# Patient Record
Sex: FEMALE | Race: WHITE | Employment: FULL TIME | URBAN - NONMETROPOLITAN AREA
[De-identification: names, ages, dates, MRNs, and addresses within clinical notes are randomized per-mention and may not be internally consistent; named-entity substitution may affect disease eponyms.]

---

## 2017-01-27 ENCOUNTER — MEDICAL CORRESPONDENCE (OUTPATIENT)
Dept: HEALTH INFORMATION MANAGEMENT | Facility: HOSPITAL | Age: 42
End: 2017-01-27

## 2017-01-27 ENCOUNTER — TELEPHONE (OUTPATIENT)
Dept: FAMILY MEDICINE | Facility: OTHER | Age: 42
End: 2017-01-27

## 2017-01-27 NOTE — TELEPHONE ENCOUNTER
12:13 PM    Reason for Call: Phone Call    Description: Akiko (nurse) with Dr Evans's office at [facility] called/Dr Evans's ordered a depo injection 150 mg every 14 weeks for pt/wondering if pt can have this done at our clinic/please call them back at [phone]/if Akkio not available ok to leave message with another nurse    Was an appointment offered for this call? No    Preferred method for responding to this message: Telephone Call    If we cannot reach you directly, may we leave a detailed response at the number you provided? Yes    Can this message wait until your PCP/provider returns, if available today? Not applicable    Julia Stokes

## 2017-02-10 ENCOUNTER — APPOINTMENT (OUTPATIENT)
Dept: LAB | Facility: OTHER | Age: 42
End: 2017-02-10
Attending: FAMILY MEDICINE
Payer: COMMERCIAL

## 2017-02-10 ENCOUNTER — ALLIED HEALTH/NURSE VISIT (OUTPATIENT)
Dept: FAMILY MEDICINE | Facility: OTHER | Age: 42
End: 2017-02-10
Attending: FAMILY MEDICINE
Payer: COMMERCIAL

## 2017-02-10 DIAGNOSIS — Z30.42 ENCOUNTER FOR SURVEILLANCE OF INJECTABLE CONTRACEPTIVE: Primary | ICD-10-CM

## 2017-02-10 LAB — HCG UR QL: NEGATIVE

## 2017-02-10 PROCEDURE — 81025 URINE PREGNANCY TEST: CPT | Performed by: FAMILY MEDICINE

## 2017-02-10 PROCEDURE — 96372 THER/PROPH/DIAG INJ SC/IM: CPT

## 2017-02-10 NOTE — NURSING NOTE
Patient is here for a Depo provera that was ordered by Dr. Alexus Evans from [facility]. Written order that is scanned in.

## 2017-05-18 ENCOUNTER — ALLIED HEALTH/NURSE VISIT (OUTPATIENT)
Dept: FAMILY MEDICINE | Facility: OTHER | Age: 42
End: 2017-05-18
Attending: FAMILY MEDICINE
Payer: COMMERCIAL

## 2017-05-18 DIAGNOSIS — N83.209 OVARIAN CYST: Primary | ICD-10-CM

## 2017-05-18 PROCEDURE — 96372 THER/PROPH/DIAG INJ SC/IM: CPT

## 2017-05-18 RX ORDER — MEDROXYPROGESTERONE ACETATE 150 MG/ML
150 INJECTION, SUSPENSION INTRAMUSCULAR
COMMUNITY
End: 2018-01-25

## 2017-05-18 NOTE — NURSING NOTE
The following medication was given:     MEDICATION: Depo Provera 150mg  ROUTE: IM  SITE: Deltoid - Left  DOSE: 150mg  LOT #: [id number]  :  [employer]   EXPIRATION DATE:  11/2019  NDC#: 755301830-2  Next injection due Aug 24, 2017    Celeste Wray

## 2017-05-18 NOTE — PROGRESS NOTES
The following medication was given:     MEDICATION: Depo Provera 150mg  ROUTE: IM  SITE: Deltoid - Left  DOSE: 150mg  LOT #: [id number]  :  [employer]   EXPIRATION DATE:  11/2019  NDC#: 088232396-9  Next injection due Aug 24, 2017    Celeste Wray     Pt also reports 1st injection made her affect flat and no libido, did decide to go ahead with 2nd injection with the hopes this will lessen.  She will call Dr. Evans and discuss this with her.      Celeste Wray

## 2017-05-18 NOTE — MR AVS SNAPSHOT
"              After Visit Summary   2017    Dottie Rodriguez    MRN: [id number]           Patient Information     Date Of Birth          1975        Visit Information        Provider Department      2017 3:45 PM [facility] NURSE [facility]        Today's Diagnoses     Ovarian cyst    -  1       Follow-ups after your visit        Who to contact     If you have questions or need follow up information about today's clinic visit or your schedule please contact [facility] directly at [phone].  Normal or non-critical lab and imaging results will be communicated to you by Vertical Knowledgehart, letter or phone within 4 business days after the clinic has received the results. If you do not hear from us within 7 days, please contact the clinic through Vertical Knowledgehart or phone. If you have a critical or abnormal lab result, we will notify you by phone as soon as possible.  Submit refill requests through Admittedly or call your pharmacy and they will forward the refill request to us. Please allow 3 business days for your refill to be completed.          Additional Information About Your Visit        MyChart Information     Admittedly lets you send messages to your doctor, view your test results, renew your prescriptions, schedule appointments and more. To sign up, go to [email or web address] . Click on \"Log in\" on the left side of the screen, which will take you to the Welcome page. Then click on \"Sign up Now\" on the right side of the page.     You will be asked to enter the access code listed below, as well as some personal information. Please follow the directions to create your username and password.     Your access code is: [email or web address]  Expires: 2017  4:16 PM     Your access code will  in 90 days. If you need help or a new code, please call your [facility] or [phone].        Care EveryWhere ID     This is your Care EveryWhere ID. This could be used by other organizations to access your " [place] medical records  [id number]         Blood Pressure from Last 3 Encounters:   06/13/16 118/68   03/23/16 106/64    Weight from Last 3 Encounters:   06/13/16 179 lb 3.2 oz (81.3 kg)   03/23/16 183 lb (83 kg)              We Performed the Following     INJECTION INTRAMUSCULAR OR SUB-Q     Medroxyprogesterone inj  1mg   (Depo Provera J-Code)        Primary Care Provider    None Specified       No primary provider on file.        Thank you!     Thank you for choosing [facility]  for your care. Our goal is always to provide you with excellent care. Hearing back from our patients is one way we can continue to improve our services. Please take a few minutes to complete the written survey that you may receive in the mail after your visit with us. Thank you!             Your Updated Medication List - Protect others around you: Learn how to safely use, store and throw away your medicines at www.disposemymeds.org.          This list is accurate as of: 5/18/17  4:16 PM.  Always use your most recent med list.                   Brand Name Dispense Instructions for use    DEPO-PROVERA 150 MG/ML injection   Generic drug:  medroxyPROGESTERone      Inject 150 mg into the muscle Once every 14 weeks continue until January 2018       UNABLE TO FIND      MEDICATION NAME: fiber gummie 2-3 a day

## 2018-01-25 ENCOUNTER — OFFICE VISIT (OUTPATIENT)
Dept: FAMILY MEDICINE | Facility: OTHER | Age: 43
End: 2018-01-25
Attending: NURSE PRACTITIONER
Payer: COMMERCIAL

## 2018-01-25 VITALS
SYSTOLIC BLOOD PRESSURE: 106 MMHG | HEART RATE: 76 BPM | TEMPERATURE: 98.9 F | HEIGHT: 67 IN | WEIGHT: 195 LBS | DIASTOLIC BLOOD PRESSURE: 72 MMHG | BODY MASS INDEX: 30.61 KG/M2 | RESPIRATION RATE: 14 BRPM | OXYGEN SATURATION: 96 %

## 2018-01-25 DIAGNOSIS — R05.9 COUGH: Primary | ICD-10-CM

## 2018-01-25 DIAGNOSIS — J10.1 INFLUENZA A: ICD-10-CM

## 2018-01-25 LAB
FLUAV+FLUBV AG SPEC QL: NEGATIVE
FLUAV+FLUBV AG SPEC QL: POSITIVE
SPECIMEN SOURCE: ABNORMAL

## 2018-01-25 PROCEDURE — 99213 OFFICE O/P EST LOW 20 MIN: CPT | Performed by: NURSE PRACTITIONER

## 2018-01-25 PROCEDURE — 87804 INFLUENZA ASSAY W/OPTIC: CPT | Performed by: NURSE PRACTITIONER

## 2018-01-25 ASSESSMENT — ANXIETY QUESTIONNAIRES
3. WORRYING TOO MUCH ABOUT DIFFERENT THINGS: NOT AT ALL
2. NOT BEING ABLE TO STOP OR CONTROL WORRYING: NOT AT ALL
1. FEELING NERVOUS, ANXIOUS, OR ON EDGE: NOT AT ALL
7. FEELING AFRAID AS IF SOMETHING AWFUL MIGHT HAPPEN: NOT AT ALL
6. BECOMING EASILY ANNOYED OR IRRITABLE: NOT AT ALL
5. BEING SO RESTLESS THAT IT IS HARD TO SIT STILL: NOT AT ALL
GAD7 TOTAL SCORE: 0

## 2018-01-25 ASSESSMENT — PATIENT HEALTH QUESTIONNAIRE - PHQ9: 5. POOR APPETITE OR OVEREATING: NOT AT ALL

## 2018-01-25 NOTE — PROGRESS NOTES
SUBJECTIVE:   Dottie Rodriguez is a 42 year old female who presents to clinic today for the following health issues:      Acute Illness   Acute illness concerns: fever and cough  Onset: Sunday    Fever: YES    Chills/Sweats: YES    Headache (location?): YES    Sinus Pressure:YES    Conjunctivitis:  YES- a little bit    Ear Pain: YES- a little    Rhinorrhea: YES    Congestion: YES    Sore Throat: YES- on Tuesday     Cough: YES    Wheeze: no     Decreased Appetite: YES    Nausea: YES    Vomiting: no     Diarrhea:  no     Dysuria/Freq.: no     Fatigue/Achiness: YES    Sick/Strep Exposure: YES-      Therapies Tried and outcome: Advil      Problem list and histories reviewed & adjusted, as indicated.  Additional history: as documented    Patient Active Problem List   Diagnosis     ACP (advance care planning)     Past Surgical History:   Procedure Laterality Date     COLONOSCOPY  8/18/16     ENT SURGERY      T & A     GYN SURGERY      c section x 2       Social History   Substance Use Topics     Smoking status: Never Smoker     Smokeless tobacco: Never Used     Alcohol use 0.0 oz/week     0 Standard drinks or equivalent per week      Comment: social     Family History   Problem Relation Age of Onset     Hypertension Mother      Rheumatoid Arthritis Mother      Hyperlipidemia Father          Current Outpatient Prescriptions   Medication Sig Dispense Refill     UNABLE TO FIND MEDICATION NAME: fiber gummie 2-3 a day       Allergies   Allergen Reactions     Ciprofloxacin Nausea and Vomiting     Recent Labs   Lab Test  06/13/16   1710   ALT  26   CR  0.82   GFRESTIMATED  76   GFRESTBLACK  >90   GFR Calc     POTASSIUM  3.7   TSH  1.28      BP Readings from Last 3 Encounters:   01/25/18 106/72   06/13/16 118/68   03/23/16 106/64    Wt Readings from Last 3 Encounters:   01/25/18 195 lb (88.5 kg)   06/13/16 179 lb 3.2 oz (81.3 kg)   03/23/16 183 lb (83 kg)               Reviewed and updated as needed  "this visit by clinical staff  Tobacco  Allergies  Meds       Reviewed and updated as needed this visit by Provider         ROS:  Constitutional, HEENT, cardiovascular, pulmonary, gi and gu systems are negative, except as otherwise noted.    OBJECTIVE:     /72 (BP Location: Left arm, Patient Position: Sitting, Cuff Size: Adult Large)  Pulse 76  Temp 98.9  F (37.2  C) (Tympanic)  Resp 14  Ht 5' 7\" (1.702 m)  Wt 195 lb (88.5 kg)  SpO2 96%  BMI 30.54 kg/m2  Body mass index is 30.54 kg/(m^2).  GENERAL: alert and no distress but ill appearing  HENT: normal cephalic/atraumatic, both ears: dull, nose and mouth without ulcers or lesions, nasal mucosa edematous , rhinorrhea clear and oral mucous membranes moist, throat mildly erythematous without exudate.   NECK: no adenopathy, no asymmetry, masses, or scars and thyroid normal to palpation  RESP: lungs clear to auscultation - no rales, rhonchi or wheezes but mild cough on inspiration.   CV: regular rate and rhythm, normal S1 S2, no S3 or S4, no murmur, click or rub, no peripheral edema and peripheral pulses strong  MS: no gross musculoskeletal defects noted, no edema  PSYCH: mentation appears normal, affect normal/bright    Diagnostic Test Results:  Results for orders placed or performed in visit on 01/25/18 (from the past 24 hour(s))   Influenza A/B antigen   Result Value Ref Range    Influenza A/B Agn Specimen Nares     Influenza A Positive (A) NEG^Negative    Influenza B Negative NEG^Negative       ASSESSMENT/PLAN:       1. Cough  - Influenza A/B antigen - positive    2. Influenza A  Symptomatic cares, rest, fluids, tylenol/ibupforen per package directions.   Declined work note - out until Monday    FUTURE APPOINTMENTS:       - Follow-up visit as needed or if no improvement     Kaycee Salgado NP  [facility]"

## 2018-01-25 NOTE — MR AVS SNAPSHOT
"              After Visit Summary   2018    Dottie Rodriguez    MRN: [id number]           Patient Information     Date Of Birth          1975        Visit Information        Provider Department      2018 3:30 PM Kaycee Salgado NP [facility]        Today's Diagnoses     Cough    -  1    Influenza A           Follow-ups after your visit        Follow-up notes from your care team     Return if symptoms worsen or fail to improve.      Who to contact     If you have questions or need follow up information about today's clinic visit or your schedule please contact [facility] directly at [phone].  Normal or non-critical lab and imaging results will be communicated to you by MicroJobhart, letter or phone within 4 business days after the clinic has received the results. If you do not hear from us within 7 days, please contact the clinic through MicroJobhart or phone. If you have a critical or abnormal lab result, we will notify you by phone as soon as possible.  Submit refill requests through Cloudbot or call your pharmacy and they will forward the refill request to us. Please allow 3 business days for your refill to be completed.          Additional Information About Your Visit        MyChart Information     Cloudbot lets you send messages to your doctor, view your test results, renew your prescriptions, schedule appointments and more. To sign up, go to [email or web address] . Click on \"Log in\" on the left side of the screen, which will take you to the Welcome page. Then click on \"Sign up Now\" on the right side of the page.     You will be asked to enter the access code listed below, as well as some personal information. Please follow the directions to create your username and password.     Your access code is: [email or web address]  Expires: 2018  4:07 PM     Your access code will  in 90 days. If you need help or a new code, please call your [facility] or [phone].   " "     Care EveryWhere ID     This is your Care EveryWhere ID. This could be used by other organizations to access your [place] medical records  [id number]        Your Vitals Were     Pulse Temperature Respirations Height Pulse Oximetry BMI (Body Mass Index)    76 98.9  F (37.2  C) (Tympanic) 14 5' 7\" (1.702 m) 96% 30.54 kg/m2       Blood Pressure from Last 3 Encounters:   01/25/18 106/72   06/13/16 118/68   03/23/16 106/64    Weight from Last 3 Encounters:   01/25/18 195 lb (88.5 kg)   06/13/16 179 lb 3.2 oz (81.3 kg)   03/23/16 183 lb (83 kg)              We Performed the Following     Influenza A/B antigen        Primary Care Provider    None Specified       No primary provider on file.        Equal Access to Services     [facility]: Ginny Reid, gala trejo, danielle sahni, luis enrique more . [facility] [phone].    ATENCIÓN: Si habla español, tiene a venegas disposición servicios gratuitos de asistencia lingüística. Giovanna al [phone].    We comply with applicable federal civil rights laws and [place] laws. We do not discriminate on the basis of race, color, national origin, age, disability, sex, sexual orientation, or gender identity.            Thank you!     Thank you for choosing [facility]  for your care. Our goal is always to provide you with excellent care. Hearing back from our patients is one way we can continue to improve our services. Please take a few minutes to complete the written survey that you may receive in the mail after your visit with us. Thank you!             Your Updated Medication List - Protect others around you: Learn how to safely use, store and throw away your medicines at www.disposemymeds.org.          This list is accurate as of 1/25/18  4:07 PM.  Always use your most recent med list.                   Brand Name Dispense Instructions for use Diagnosis    UNABLE TO FIND      MEDICATION NAME: fiber gummie " 2-3 a day

## 2018-01-25 NOTE — NURSING NOTE
"Chief Complaint   Patient presents with     Fever     Cough       Initial /72 (BP Location: Left arm, Patient Position: Sitting, Cuff Size: Adult Large)  Pulse 76  Temp 98.9  F (37.2  C) (Tympanic)  Resp 14  Ht 5' 7\" (1.702 m)  Wt 195 lb (88.5 kg)  SpO2 96%  BMI 30.54 kg/m2 Estimated body mass index is 30.54 kg/(m^2) as calculated from the following:    Height as of this encounter: 5' 7\" (1.702 m).    Weight as of this encounter: 195 lb (88.5 kg).  Medication Reconciliation: complete   Cheli Sanchez      "

## 2018-01-26 ASSESSMENT — PATIENT HEALTH QUESTIONNAIRE - PHQ9: SUM OF ALL RESPONSES TO PHQ QUESTIONS 1-9: 0

## 2018-01-26 ASSESSMENT — ANXIETY QUESTIONNAIRES: GAD7 TOTAL SCORE: 0

## 2018-07-09 ENCOUNTER — OFFICE VISIT (OUTPATIENT)
Dept: FAMILY MEDICINE | Facility: OTHER | Age: 43
End: 2018-07-09
Attending: FAMILY MEDICINE
Payer: COMMERCIAL

## 2018-07-09 VITALS
HEART RATE: 63 BPM | TEMPERATURE: 97.7 F | OXYGEN SATURATION: 96 % | SYSTOLIC BLOOD PRESSURE: 116 MMHG | DIASTOLIC BLOOD PRESSURE: 80 MMHG | WEIGHT: 204.4 LBS | HEIGHT: 67 IN | BODY MASS INDEX: 32.08 KG/M2

## 2018-07-09 DIAGNOSIS — R10.13 EPIGASTRIC PAIN: Primary | ICD-10-CM

## 2018-07-09 PROCEDURE — 99213 OFFICE O/P EST LOW 20 MIN: CPT | Performed by: FAMILY MEDICINE

## 2018-07-09 ASSESSMENT — ANXIETY QUESTIONNAIRES
GAD7 TOTAL SCORE: 0
1. FEELING NERVOUS, ANXIOUS, OR ON EDGE: NOT AT ALL
2. NOT BEING ABLE TO STOP OR CONTROL WORRYING: NOT AT ALL
6. BECOMING EASILY ANNOYED OR IRRITABLE: NOT AT ALL
7. FEELING AFRAID AS IF SOMETHING AWFUL MIGHT HAPPEN: NOT AT ALL
4. TROUBLE RELAXING: NOT AT ALL
3. WORRYING TOO MUCH ABOUT DIFFERENT THINGS: NOT AT ALL
5. BEING SO RESTLESS THAT IT IS HARD TO SIT STILL: NOT AT ALL

## 2018-07-09 ASSESSMENT — PAIN SCALES - GENERAL: PAINLEVEL: NO PAIN (0)

## 2018-07-09 NOTE — NURSING NOTE
"Chief Complaint   Patient presents with     GI Problem       Initial /80  Pulse 63  Temp 97.7  F (36.5  C)  Ht 5' 7\" (1.702 m)  Wt 204 lb 6.4 oz (92.7 kg)  SpO2 96%  Breastfeeding? No  BMI 32.01 kg/m2 Estimated body mass index is 32.01 kg/(m^2) as calculated from the following:    Height as of this encounter: 5' 7\" (1.702 m).    Weight as of this encounter: 204 lb 6.4 oz (92.7 kg).  Medication Reconciliation: complete    KRISTA Pacheco    "

## 2018-07-09 NOTE — MR AVS SNAPSHOT
After Visit Summary   7/9/2018    Dottie Rodriguez    MRN: [id number]           Patient Information     Date Of Birth          1975        Visit Information        Provider Department      7/9/2018 8:30 AM Savanna Cunningham MD [facility]        Today's Diagnoses     Epigastric pain    -  1       Follow-ups after your visit        Follow-up notes from your care team     Return if symptoms worsen or fail to improve.      Your next 10 appointments already scheduled     Jul 10, 2018  1:00 PM CDT   US ABDOMEN COMPLETE with HIUS2   HI ULTRASOUND ([facility] )    [address] [place] [id number]   [phone]           Please bring a list of your medicines (including vitamins, minerals and over-the-counter drugs). Also, tell your doctor about any allergies you may have. Wear comfortable clothes and leave your valuables at home.  Adults: No eating or drinking for 8 hours before the exam. You may take medicine with a small sip of water.  Children: - Children 6+ years: No food or drink for 6 hours before exam. - Children 1-5 years: No food or drink for 4 hours before exam. - Infants, breast-fed: may have breast milk up to 2 hours before exam. - Infants, formula: may have bottle until 4 hours before exam.  Please call the Imaging Department at your exam site with any questions.              Future tests that were ordered for you today     Open Future Orders        Priority Expected Expires Ordered    US Abdomen Complete Routine  7/9/2019 7/9/2018            Who to contact     If you have questions or need follow up information about today's clinic visit or your schedule please contact [facility] directly at [phone].  Normal or non-critical lab and imaging results will be communicated to you by MyChart, letter or phone within 4 business days after the clinic has received the results. If you do not hear from us within 7 days, please contact the clinic  "through Pied Piper or phone. If you have a critical or abnormal lab result, we will notify you by phone as soon as possible.  Submit refill requests through Pied Piper or call your pharmacy and they will forward the refill request to us. Please allow 3 business days for your refill to be completed.          Additional Information About Your Visit        Pied Piper Information     Pied Piper lets you send messages to your doctor, view your test results, renew your prescriptions, schedule appointments and more. To sign up, go to [email or web address] . Click on \"Log in\" on the left side of the screen, which will take you to the Welcome page. Then click on \"Sign up Now\" on the right side of the page.     You will be asked to enter the access code listed below, as well as some personal information. Please follow the directions to create your username and password.     Your access code is: [email or web address]  Expires: 10/7/2018  8:20 AM     Your access code will  in 90 days. If you need help or a new code, please call your [place] clinic or [phone].        Care EveryWhere ID     This is your Care EveryWhere ID. This could be used by other organizations to access your [place] medical records  [id number]        Your Vitals Were     Pulse Temperature Height Pulse Oximetry Breastfeeding? BMI (Body Mass Index)    63 97.7  F (36.5  C) 5' 7\" (1.702 m) 96% No 32.01 kg/m2       Blood Pressure from Last 3 Encounters:   18 116/80   18 106/72   16 118/68    Weight from Last 3 Encounters:   18 204 lb 6.4 oz (92.7 kg)   18 195 lb (88.5 kg)   16 179 lb 3.2 oz (81.3 kg)               Primary Care Provider Fax #    Physician No Ref-Primary [phone]       No address on file        Equal Access to Services     [facility] : Ginny Reid, gala trejo, luis enrique mckay . [facility] [phone].    ATENCIÓN: Si cirilo mcleod " venegas disposición servicios gratuitos de asistencia lingüística. Giovanna roa [phone].    We comply with applicable federal civil rights laws and [place] laws. We do not discriminate on the basis of race, color, national origin, age, disability, sex, sexual orientation, or gender identity.            Thank you!     Thank you for choosing [facility]  for your care. Our goal is always to provide you with excellent care. Hearing back from our patients is one way we can continue to improve our services. Please take a few minutes to complete the written survey that you may receive in the mail after your visit with us. Thank you!             Your Updated Medication List - Protect others around you: Learn how to safely use, store and throw away your medicines at www.disposemymeds.org.          This list is accurate as of 7/9/18 10:04 AM.  Always use your most recent med list.                   Brand Name Dispense Instructions for use Diagnosis    PROBIOTIC DAILY PO           UNABLE TO FIND      MEDICATION NAME: fiber gummie 2-3 a day

## 2018-07-09 NOTE — PROGRESS NOTES
"        SUBJECTIVE:   Dottie Rodriguez is a 42 year old female who presents to clinic today for the following health issues:      ER/UC Follow up    Facility:  [facility]  Date of visit: 7/1/2018  Reason for visit: Epigastric pain  Current Status: Feeling fine overall.  Notes reviewed.  Patient states she will have intermittent symptoms.  She states that the GI cocktail given in the ER did not resolve symptoms.  She would like to get to the root cause of her symptoms.  She states she does have a significant family history of gallbladder issues.  She has never had an EGD previously.         Problem list and histories reviewed & adjusted, as indicated.  Additional history: as documented    Patient Active Problem List   Diagnosis     ACP (advance care planning)     Past Surgical History:   Procedure Laterality Date     COLONOSCOPY  8/18/16     ENT SURGERY      T & A     GYN SURGERY      c section x 2       Social History   Substance Use Topics     Smoking status: Never Smoker     Smokeless tobacco: Never Used     Alcohol use 0.0 oz/week     0 Standard drinks or equivalent per week      Comment: social     Family History   Problem Relation Age of Onset     Hypertension Mother      Rheumatoid Arthritis Mother      Hyperlipidemia Father          Current Outpatient Prescriptions   Medication Sig Dispense Refill     Probiotic Product (PROBIOTIC DAILY PO)        UNABLE TO FIND MEDICATION NAME: fiber gummie 2-3 a day       Allergies   Allergen Reactions     Ciprofloxacin Nausea and Vomiting       Reviewed and updated as needed this visit by clinical staff       Reviewed and updated as needed this visit by Provider         ROS:  Constitutional, HEENT, cardiovascular, pulmonary, gi and gu systems are negative, except as otherwise noted.    OBJECTIVE:     /80  Pulse 63  Temp 97.7  F (36.5  C)  Ht 5' 7\" (1.702 m)  Wt 204 lb 6.4 oz (92.7 kg)  SpO2 96%  Breastfeeding? No  BMI 32.01 kg/m2  Body mass index is 32.01 " kg/(m^2).  GENERAL: healthy, alert and no distress  PSYCH: mentation appears normal, affect normal/bright    Diagnostic Test Results:  none     ASSESSMENT/PLAN:     1. Epigastric pain  We will start with ultrasound to rule out gallbladder disease (consider HIDA scan if symptoms become more persistent and ultrasound is not clear).  Otherwise, if ultrasound is negative, would consider referral to GI for upper endoscopy.  Patient is agreeable to this plan.  - US Abdomen Complete; Future        Savanna Cunningham MD  [facility]

## 2018-07-10 ASSESSMENT — PATIENT HEALTH QUESTIONNAIRE - PHQ9: SUM OF ALL RESPONSES TO PHQ QUESTIONS 1-9: 0

## 2018-07-10 ASSESSMENT — ANXIETY QUESTIONNAIRES: GAD7 TOTAL SCORE: 0

## 2018-07-12 ENCOUNTER — HOSPITAL ENCOUNTER (OUTPATIENT)
Dept: ULTRASOUND IMAGING | Facility: HOSPITAL | Age: 43
Discharge: HOME | End: 2018-07-12
Attending: FAMILY MEDICINE | Admitting: FAMILY MEDICINE
Payer: COMMERCIAL

## 2018-07-12 DIAGNOSIS — R10.13 EPIGASTRIC PAIN: ICD-10-CM

## 2018-07-12 DIAGNOSIS — K80.20 SYMPTOMATIC CHOLELITHIASIS: Primary | ICD-10-CM

## 2018-07-12 PROCEDURE — 76705 ECHO EXAM OF ABDOMEN: CPT | Mod: TC

## 2018-08-02 ENCOUNTER — OFFICE VISIT (OUTPATIENT)
Dept: SURGERY | Facility: OTHER | Age: 43
End: 2018-08-02
Attending: FAMILY MEDICINE
Payer: COMMERCIAL

## 2018-08-02 VITALS
SYSTOLIC BLOOD PRESSURE: 92 MMHG | TEMPERATURE: 98.3 F | HEART RATE: 70 BPM | OXYGEN SATURATION: 98 % | BODY MASS INDEX: 32.02 KG/M2 | WEIGHT: 204 LBS | DIASTOLIC BLOOD PRESSURE: 62 MMHG | HEIGHT: 67 IN

## 2018-08-02 DIAGNOSIS — K80.20 SYMPTOMATIC CHOLELITHIASIS: ICD-10-CM

## 2018-08-02 PROCEDURE — 99204 OFFICE O/P NEW MOD 45 MIN: CPT | Performed by: SURGERY

## 2018-08-02 ASSESSMENT — PAIN SCALES - GENERAL: PAINLEVEL: NO PAIN (0)

## 2018-08-02 NOTE — PROGRESS NOTES
[facility] Surgery Consultation    CC:  Symptomatic cholelithiasis    HPI:  This 43 year old year old female is seen at the request of Savanna Willis for evaluation of symptomatic cholelithiasis.  The history is obtained from the patient, and reviewing the medical record.  She is good medical historian. She states that over the past six months she has had episodes of epigastric pain that will wake her up at night. The pain comes on and will last a few hours and then subside. A few times this has occurred she had eaten late at night. She even went into the emergency room to be evaluated. At that time she had labs drawn and was given a GI cocktail. Unfortunately she says that the GI cocktail, antacids and PPIs have not helped with these symptoms. The labs that were drawn demonstrated normal liver enzymes, electrolytes, and a mild leukocytosis. She was then seen by her PCP who ordered an ultrasound. The US showed cholelithiasis with no no evidence of cholecystitis. She was then referred for further evaluation.    Past Medical History:   Diagnosis Date     Ovarian cyst 05/18/2017    rupturing ovarian cysts       Past Surgical History:   Procedure Laterality Date     COLONOSCOPY  8/18/16     ENT SURGERY      T & A     GYN SURGERY      c section x 2       Pt denied problems with bleeding or anesthesia    Prior to Admission medications    Medication Sig Start Date End Date Taking? Authorizing Provider   HERBALS Take 2 each by mouth daily Protandim NRF-1 2 capsules by mouth daily   Yes Reported, Patient   HERBALS Take 1 each by mouth Protandim NRF-2 1 tablet by mouth daily   Yes Reported, Patient   Probiotic Product (PROBIOTIC DAILY PO)     Reported, Patient          Allergies   Allergen Reactions     Ciprofloxacin Nausea and Vomiting         HABITS:    Social History   Substance Use Topics     Smoking status: Never Smoker     Smokeless tobacco: Never Used     Alcohol use 0.0 oz/week     0 Standard drinks or  "equivalent per week      Comment: social     No mood altering drug use.    Family History   Problem Relation Age of Onset     Hypertension Mother      Rheumatoid Arthritis Mother      Hyperlipidemia Father        REVIEW OF SYSTEMS:  Ten point review of systems negative except those mentioned in the HPI.     The patient denies sleep apnea, latex allergies or MRSA    OBJECTIVE:    BP 92/62  Pulse 70  Temp 98.3  F (36.8  C)  Ht 5' 7\" (1.702 m)  Wt 204 lb (92.5 kg)  SpO2 98%  BMI 31.95 kg/m2    GENERAL: Generally appears well, in no distress with appropriate affect.  HEENT:   Sclerae anicteric - No cervical, supra/infraclavicular lymphadenopathy, no thyroid masses  Respiratory:  Lungs clear to ausculation bilaterally with good air excursion  Cardiovascular:  Regular Rate and Rhythm with no murmurs gallops or rubs, normal   Abdomen: soft, NT/ND  :  deferred  Extremities:  Extremities normal. No deformities, edema, or skin discoloration.  Skin:  no suspicious lesions or rashes  Neurological: grossly intact    Psych:  Alert, oriented, affect appropriate with normal decision making ability.      IMPRESSION:  44 yo female with symptomatic cholelithiasis    PLAN:  The pathophysiology of biliary tract disease was outlined.  The natural course of untreated disease was reviewed as was the definitive surgical approach with laparoscopic cholecystectomy and cholangiography.  The indications, risk, benefits, technical aspects, cosmetic and recuperative expectations were outlined with risks including but not limited to operative death, infection, bleeding and ductal injury requiring further operative intervention.  The need to convert to an open procedure for difficulty in achieving the laparoscopic approach was reviewed and the patient's questions asked and answered.    As she has these epigastric symptoms that are unrelieved by typical acid suppression I suggest that we proceed with a laparoscopic cholecystectomy. I did " discuss that if she continues to have pain we may have to pursue other avenues as to the cause. All questions and concerns were addressed.        Thank you for allowing me to participate in the care of your patient.       Tyshawn Ford MD    8/2/2018  10:40 AM    cc:  Savanna Willis

## 2018-08-02 NOTE — MR AVS SNAPSHOT
After Visit Summary   8/2/2018    Dottie Rodriguez    MRN: [id number]           Patient Information     Date Of Birth          1975        Visit Information        Provider Department      8/2/2018 9:30 AM Tyshawn Ford MD [facility]        Today's Diagnoses     Symptomatic cholelithiasis          Care Instructions    Thank you for allowing Dr. Ford and our surgical team to participate in your care.  If you have a scheduling or an appointment question please contact our Health Unit Coordinator at her direct line [phone].   ALL nursing questions or concerns can be directed to your clinic surgical nurse at: [phone]   Call the surgery nurse or your primary care provider if you should become ill within 1 week of your procedure and we will reschedule it when you are healthy. This includes signs or symptoms of a cold or the flu. This can include fever, chills, sore throat, cough, chest congestions, productive cough, runny nose.    You are scheduled for : laparoscopic cholecystectomy  Your procedure date is: 8/6/18      HOW TO PREPARE-    A responsible adult friend or family member must be available to drive you home AND stay with you for 24 hours after you leave the hospital. You will not be allowed to drive yourself. If you need to take a taxi or the bus you MUST have a responsible adult to ride with you. YOUR PROCEDURE WILL BE CANCELLED IF YOU DO NOT HAVE A RESPONSIBLE ADULT TO DRIVE YOU HOME.     Unless otherwise instructed, DO NOT have anything to eat or drink after midnight the night before your surgery, including water and coffee. Your stomach needs to be completely empty. Do NOT chew gum, suck on hard candy, or smoke. You can brush your teeth the morning of surgery.     Please call our Surgery Education Nurses 1-2 weeks prior to your surgery date at  [phone] or toll free [phone]. Please have you medication and allergy lists ready.    Stop your aspirin or  other NSAIDs(Ibuprofen, Motrin, Aleve, Celebrex, Naproxen, etc...) 7 days before your surgery unless otherwise instructed.    Hospital admitting will call you the day before your surgery with your arrival time. If you are scheduled on a Monday, you will be contacted the Friday before surgery.     You will need to wash the night before AND the morning of you procedure with the supplied Hibiclens following the instructions below.    Take shower with Hibiclens soap the night before surgery and morning of prior to surgery using the following directions:    SHOWERING BEFORE YOUR SURGERY    On The Night Before Your Surgery:  1. Remove all your jewelry and body piercings. (Leave these off until surgery completed.)  2. Shower your body from your neck to your feet using Hibiclens with a freshly laundered washcloth.  3. Include cleaning inside your belly button with a cotton swab.  4. Be sure to rinse off all the soap.  5. Dry your body with a newly laundered towel.  6. Wear newly laundered nightclothes.  7. Sleep in newly laundered bedding.    On The Morning of Your Surgery:  1. Shampoo your hair with your usual shampoo.  2. Shower your body with the Hibiclens again, with a freshly laundered washcloth.  3. Be sure to rinse off all the soap.  4. Dry your body with another newly laundered towel.  5. Wear newly laundered clothing to the hospital.      Return to clinic for a postop appointment 2 week(s) from your surgery date.              Follow-ups after your visit        Your next 10 appointments already scheduled     Aug 21, 2018  9:00 AM CDT   (Arrive by 8:45 AM)   Post Op with Tyshawn Ford MD   [facility] [place] ([facility] [place] )    [address]  [place] [address]   [phone]              Future tests that were ordered for you today     Open Future Orders        Priority Expected Expires Ordered    HCG qualitative urine STAT 8/6/2018 8/2/2019 8/2/2018            Who to contact     If  "you have questions or need follow up information about today's clinic visit or your schedule please contact [facility] directly at [phone].  Normal or non-critical lab and imaging results will be communicated to you by MyChart, letter or phone within 4 business days after the clinic has received the results. If you do not hear from us within 7 days, please contact the clinic through [employer] or phone. If you have a critical or abnormal lab result, we will notify you by phone as soon as possible.  Submit refill requests through [employer] or call your pharmacy and they will forward the refill request to us. Please allow 3 business days for your refill to be completed.          Additional Information About Your Visit        [employer] Information     [employer] lets you send messages to your doctor, view your test results, renew your prescriptions, schedule appointments and more. To sign up, go to [email or web address] . Click on \"Log in\" on the left side of the screen, which will take you to the Welcome page. Then click on \"Sign up Now\" on the right side of the page.     You will be asked to enter the access code listed below, as well as some personal information. Please follow the directions to create your username and password.     Your access code is: [email or web address]  Expires: 10/7/2018  8:20 AM     Your access code will  in 90 days. If you need help or a new code, please call your [facility] clinic or [phone].        Care EveryWhere ID     This is your Care EveryWhere ID. This could be used by other organizations to access your [facility] medical records  [id number]        Your Vitals Were     Pulse Temperature Height Pulse Oximetry BMI (Body Mass Index)       70 98.3  F (36.8  C) 5' 7\" (1.702 m) 98% 31.95 kg/m2        Blood Pressure from Last 3 Encounters:   18 92/62   18 116/80   18 106/72    Weight from Last 3 Encounters:   18 204 lb (92.5 kg)   18 204 lb 6.4 oz " (92.7 kg)   01/25/18 195 lb (88.5 kg)               Primary Care Provider Office Phone # Fax #    Savanna Cunningham [phone] [address] [facility] [address]        Equal Access to Services     NANDO PURVI AH: Hadii crystal ku hadasho Soomaali, waaxda luqadaha, qaybta kaalmada adeegyada, waxmeena ponce hayjavidn bernard cruzashlyalexander [place]. So [facility] [phone].    ATENCIÓN: Si habla español, tiene a venegas disposición servicios gratuitos de asistencia lingüística. Llame al [phone].    We comply with applicable federal civil rights laws and [place] laws. We do not discriminate on the basis of race, color, national origin, age, disability, sex, sexual orientation, or gender identity.            Thank you!     Thank you for choosing [facility]  for your care. Our goal is always to provide you with excellent care. Hearing back from our patients is one way we can continue to improve our services. Please take a few minutes to complete the written survey that you may receive in the mail after your visit with us. Thank you!             Your Updated Medication List - Protect others around you: Learn how to safely use, store and throw away your medicines at www.disposemymeds.org.          This list is accurate as of 8/2/18 10:26 AM.  Always use your most recent med list.                   Brand Name Dispense Instructions for use Diagnosis    * HERBALS      Take 2 each by mouth daily Protandim NRF-1 2 capsules by mouth daily        * HERBALS      Take 1 each by mouth Protandim NRF-2 1 tablet by mouth daily        PROBIOTIC DAILY PO           * Notice:  This list has 2 medication(s) that are the same as other medications prescribed for you. Read the directions carefully, and ask your doctor or other care provider to review them with you.

## 2018-08-02 NOTE — PATIENT INSTRUCTIONS
Thank you for allowing Dr. Ford and our surgical team to participate in your care.  If you have a scheduling or an appointment question please contact our Health Unit Coordinator at her direct line [phone].   ALL nursing questions or concerns can be directed to your clinic surgical nurse at: [phone]   Call the surgery nurse or your primary care provider if you should become ill within 1 week of your procedure and we will reschedule it when you are healthy. This includes signs or symptoms of a cold or the flu. This can include fever, chills, sore throat, cough, chest congestions, productive cough, runny nose.    You are scheduled for : laparoscopic cholecystectomy  Your procedure date is: 8/6/18      HOW TO PREPARE-    A responsible adult friend or family member must be available to drive you home AND stay with you for 24 hours after you leave the hospital. You will not be allowed to drive yourself. If you need to take a taxi or the bus you MUST have a responsible adult to ride with you. YOUR PROCEDURE WILL BE CANCELLED IF YOU DO NOT HAVE A RESPONSIBLE ADULT TO DRIVE YOU HOME.     Unless otherwise instructed, DO NOT have anything to eat or drink after midnight the night before your surgery, including water and coffee. Your stomach needs to be completely empty. Do NOT chew gum, suck on hard candy, or smoke. You can brush your teeth the morning of surgery.     Please call our Surgery Education Nurses 1-2 weeks prior to your surgery date at  [phone] or toll free [phone]. Please have you medication and allergy lists ready.    Stop your aspirin or other NSAIDs(Ibuprofen, Motrin, Aleve, Celebrex, Naproxen, etc...) 7 days before your surgery unless otherwise instructed.    Hospital admitting will call you the day before your surgery with your arrival time. If you are scheduled on a Monday, you will be contacted the Friday before surgery.     You will need to wash the night before AND the morning of  you procedure with the supplied Hibiclens following the instructions below.    Take shower with Hibiclens soap the night before surgery and morning of prior to surgery using the following directions:    SHOWERING BEFORE YOUR SURGERY    On The Night Before Your Surgery:  1. Remove all your jewelry and body piercings. (Leave these off until surgery completed.)  2. Shower your body from your neck to your feet using Hibiclens with a freshly laundered washcloth.  3. Include cleaning inside your belly button with a cotton swab.  4. Be sure to rinse off all the soap.  5. Dry your body with a newly laundered towel.  6. Wear newly laundered nightclothes.  7. Sleep in newly laundered bedding.    On The Morning of Your Surgery:  1. Shampoo your hair with your usual shampoo.  2. Shower your body with the Hibiclens again, with a freshly laundered washcloth.  3. Be sure to rinse off all the soap.  4. Dry your body with another newly laundered towel.  5. Wear newly laundered clothing to the hospital.      Return to clinic for a postop appointment 2 week(s) from your surgery date.

## 2018-08-02 NOTE — NURSING NOTE
"Chief Complaint   Patient presents with     Consult For     cholelithiasis. Referred by Dr. Willis.       Initial BP 92/62  Pulse 70  Temp 98.3  F (36.8  C)  Ht 5' 7\" (1.702 m)  Wt 204 lb (92.5 kg)  SpO2 98%  BMI 31.95 kg/m2 Estimated body mass index is 31.95 kg/(m^2) as calculated from the following:    Height as of this encounter: 5' 7\" (1.702 m).    Weight as of this encounter: 204 lb (92.5 kg).  Medication Reconciliation: complete    KHALIDA MCFARLANE LPN    "

## 2018-08-03 ENCOUNTER — ANESTHESIA EVENT (OUTPATIENT)
Dept: SURGERY | Facility: HOSPITAL | Age: 43
End: 2018-08-03
Payer: COMMERCIAL

## 2018-08-03 NOTE — ANESTHESIA PREPROCEDURE EVALUATION
Anesthesia Evaluation     . Pt has had prior anesthetic. Type: General and MAC    No history of anesthetic complications          ROS/MED HX    ENT/Pulmonary:  - neg pulmonary ROS     Neurologic:  - neg neurologic ROS   (+)migraines,     Cardiovascular:  - neg cardiovascular ROS       METS/Exercise Tolerance:     Hematologic:  - neg hematologic  ROS       Musculoskeletal:  - neg musculoskeletal ROS       GI/Hepatic:     (+) cholecystitis/cholelithiasis,       Renal/Genitourinary:  - ROS Renal section negative   (+) Other Renal/ Genitourinary, h/o Ovarian Cyst Rupture 5/18/2017      Endo:     (+) Obesity, .      Psychiatric:  - neg psychiatric ROS       Infectious Disease:  - neg infectious disease ROS       Malignancy:      - no malignancy   Other:                     Physical Exam  Normal systems: cardiovascular and pulmonary    Airway   Mallampati: I  TM distance: >3 FB  Neck ROM: full    Dental     Cardiovascular   Rhythm and rate: regular and normal      Pulmonary    breath sounds clear to auscultation                    Anesthesia Plan      History & Physical Review      ASA Status:  2 .        Plan for General and ETT with Intravenous and Propofol induction. Maintenance will be Balanced.    PONV prophylaxis:  Ondansetron (or other 5HT-3), Scopolamine patch and Dexamethasone or Solumedrol  HCG Negative      Postoperative Care  Postoperative pain management:  IV analgesics and Oral pain medications.      Consents  Anesthetic plan, risks, benefits and alternatives discussed with:  Patient..                          .

## 2018-08-06 ENCOUNTER — APPOINTMENT (OUTPATIENT)
Dept: LAB | Facility: HOSPITAL | Age: 43
End: 2018-08-06
Attending: SURGERY
Payer: COMMERCIAL

## 2018-08-06 ENCOUNTER — HOSPITAL ENCOUNTER (OUTPATIENT)
Facility: HOSPITAL | Age: 43
Discharge: HOME | End: 2018-08-06
Attending: SURGERY | Admitting: SURGERY
Payer: COMMERCIAL

## 2018-08-06 ENCOUNTER — ANESTHESIA (OUTPATIENT)
Dept: SURGERY | Facility: HOSPITAL | Age: 43
End: 2018-08-06
Payer: COMMERCIAL

## 2018-08-06 VITALS
HEIGHT: 67 IN | SYSTOLIC BLOOD PRESSURE: 137 MMHG | OXYGEN SATURATION: 94 % | RESPIRATION RATE: 18 BRPM | DIASTOLIC BLOOD PRESSURE: 85 MMHG | WEIGHT: 206.6 LBS | BODY MASS INDEX: 32.43 KG/M2 | TEMPERATURE: 97.6 F

## 2018-08-06 DIAGNOSIS — K80.20 SYMPTOMATIC CHOLELITHIASIS: Primary | ICD-10-CM

## 2018-08-06 LAB — HCG UR QL: NEGATIVE

## 2018-08-06 PROCEDURE — 27210794 ZZH OR GENERAL SUPPLY STERILE: Performed by: SURGERY

## 2018-08-06 PROCEDURE — 40000065 ZZH STATISTIC EKG NON-CHARGEABLE

## 2018-08-06 PROCEDURE — 37000008 ZZH ANESTHESIA TECHNICAL FEE, 1ST 30 MIN: Performed by: SURGERY

## 2018-08-06 PROCEDURE — 27110028 ZZH OR GENERAL SUPPLY NON-STERILE: Performed by: SURGERY

## 2018-08-06 PROCEDURE — 36000058 ZZH SURGERY LEVEL 3 EA 15 ADDTL MIN: Performed by: SURGERY

## 2018-08-06 PROCEDURE — 81025 URINE PREGNANCY TEST: CPT | Performed by: ANESTHESIOLOGY

## 2018-08-06 PROCEDURE — 71000015 ZZH RECOVERY PHASE 1 LEVEL 2 EA ADDTL HR: Performed by: SURGERY

## 2018-08-06 PROCEDURE — 40000305 ZZH STATISTIC PRE PROC ASSESS I: Performed by: SURGERY

## 2018-08-06 PROCEDURE — 25000125 ZZHC RX 250: Performed by: NURSE ANESTHETIST, CERTIFIED REGISTERED

## 2018-08-06 PROCEDURE — 25000566 ZZH SEVOFLURANE, EA 15 MIN: Performed by: NURSE ANESTHETIST, CERTIFIED REGISTERED

## 2018-08-06 PROCEDURE — 25000128 H RX IP 250 OP 636: Performed by: ANESTHESIOLOGY

## 2018-08-06 PROCEDURE — 71000014 ZZH RECOVERY PHASE 1 LEVEL 2 FIRST HR: Performed by: SURGERY

## 2018-08-06 PROCEDURE — 71000027 ZZH RECOVERY PHASE 2 EACH 15 MINS: Performed by: SURGERY

## 2018-08-06 PROCEDURE — 47562 LAPAROSCOPIC CHOLECYSTECTOMY: CPT | Performed by: SURGERY

## 2018-08-06 PROCEDURE — 36000056 ZZH SURGERY LEVEL 3 1ST 30 MIN: Performed by: SURGERY

## 2018-08-06 PROCEDURE — 01999 UNLISTED ANES PROCEDURE: CPT | Performed by: NURSE ANESTHETIST, CERTIFIED REGISTERED

## 2018-08-06 PROCEDURE — 25000128 H RX IP 250 OP 636: Performed by: NURSE ANESTHETIST, CERTIFIED REGISTERED

## 2018-08-06 PROCEDURE — 88304 TISSUE EXAM BY PATHOLOGIST: CPT | Mod: TC | Performed by: SURGERY

## 2018-08-06 PROCEDURE — 25000125 ZZHC RX 250: Performed by: ANESTHESIOLOGY

## 2018-08-06 PROCEDURE — 93005 ELECTROCARDIOGRAM TRACING: CPT

## 2018-08-06 PROCEDURE — 25000128 H RX IP 250 OP 636: Performed by: SURGERY

## 2018-08-06 PROCEDURE — 37000009 ZZH ANESTHESIA TECHNICAL FEE, EACH ADDTL 15 MIN: Performed by: SURGERY

## 2018-08-06 PROCEDURE — 25000125 ZZHC RX 250: Performed by: SURGERY

## 2018-08-06 PROCEDURE — 25000132 ZZH RX MED GY IP 250 OP 250 PS 637: Performed by: ANESTHESIOLOGY

## 2018-08-06 RX ORDER — ONDANSETRON 4 MG/1
4 TABLET, ORALLY DISINTEGRATING ORAL EVERY 30 MIN PRN
Status: DISCONTINUED | OUTPATIENT
Start: 2018-08-06 | End: 2018-08-06 | Stop reason: HOSPADM

## 2018-08-06 RX ORDER — FENTANYL CITRATE 50 UG/ML
25-50 INJECTION, SOLUTION INTRAMUSCULAR; INTRAVENOUS
Status: DISCONTINUED | OUTPATIENT
Start: 2018-08-06 | End: 2018-08-06 | Stop reason: HOSPADM

## 2018-08-06 RX ORDER — DEXAMETHASONE SODIUM PHOSPHATE 4 MG/ML
4 INJECTION, SOLUTION INTRA-ARTICULAR; INTRALESIONAL; INTRAMUSCULAR; INTRAVENOUS; SOFT TISSUE EVERY 10 MIN PRN
Status: DISCONTINUED | OUTPATIENT
Start: 2018-08-06 | End: 2018-08-06 | Stop reason: HOSPADM

## 2018-08-06 RX ORDER — NEOSTIGMINE METHYLSULFATE 1 MG/ML
VIAL (ML) INJECTION PRN
Status: DISCONTINUED | OUTPATIENT
Start: 2018-08-06 | End: 2018-08-06

## 2018-08-06 RX ORDER — MEPERIDINE HYDROCHLORIDE 50 MG/ML
12.5 INJECTION INTRAMUSCULAR; INTRAVENOUS; SUBCUTANEOUS
Status: DISCONTINUED | OUTPATIENT
Start: 2018-08-06 | End: 2018-08-06 | Stop reason: HOSPADM

## 2018-08-06 RX ORDER — NALOXONE HYDROCHLORIDE 0.4 MG/ML
.1-.4 INJECTION, SOLUTION INTRAMUSCULAR; INTRAVENOUS; SUBCUTANEOUS
Status: DISCONTINUED | OUTPATIENT
Start: 2018-08-06 | End: 2018-08-06 | Stop reason: HOSPADM

## 2018-08-06 RX ORDER — BUPIVACAINE HYDROCHLORIDE AND EPINEPHRINE 2.5; 5 MG/ML; UG/ML
INJECTION, SOLUTION EPIDURAL; INFILTRATION; INTRACAUDAL; PERINEURAL PRN
Status: DISCONTINUED | OUTPATIENT
Start: 2018-08-06 | End: 2018-08-06 | Stop reason: HOSPADM

## 2018-08-06 RX ORDER — GLYCOPYRROLATE 0.2 MG/ML
INJECTION, SOLUTION INTRAMUSCULAR; INTRAVENOUS PRN
Status: DISCONTINUED | OUTPATIENT
Start: 2018-08-06 | End: 2018-08-06

## 2018-08-06 RX ORDER — LIDOCAINE HYDROCHLORIDE 20 MG/ML
INJECTION, SOLUTION INFILTRATION; PERINEURAL PRN
Status: DISCONTINUED | OUTPATIENT
Start: 2018-08-06 | End: 2018-08-06

## 2018-08-06 RX ORDER — HYDRALAZINE HYDROCHLORIDE 20 MG/ML
2.5-5 INJECTION INTRAMUSCULAR; INTRAVENOUS EVERY 10 MIN PRN
Status: DISCONTINUED | OUTPATIENT
Start: 2018-08-06 | End: 2018-08-06 | Stop reason: HOSPADM

## 2018-08-06 RX ORDER — KETOROLAC TROMETHAMINE 30 MG/ML
30 INJECTION, SOLUTION INTRAMUSCULAR; INTRAVENOUS EVERY 6 HOURS PRN
Status: DISCONTINUED | OUTPATIENT
Start: 2018-08-06 | End: 2018-08-06 | Stop reason: HOSPADM

## 2018-08-06 RX ORDER — SODIUM CHLORIDE, SODIUM LACTATE, POTASSIUM CHLORIDE, CALCIUM CHLORIDE 600; 310; 30; 20 MG/100ML; MG/100ML; MG/100ML; MG/100ML
INJECTION, SOLUTION INTRAVENOUS CONTINUOUS
Status: DISCONTINUED | OUTPATIENT
Start: 2018-08-06 | End: 2018-08-06 | Stop reason: HOSPADM

## 2018-08-06 RX ORDER — KETOROLAC TROMETHAMINE 30 MG/ML
INJECTION, SOLUTION INTRAMUSCULAR; INTRAVENOUS PRN
Status: DISCONTINUED | OUTPATIENT
Start: 2018-08-06 | End: 2018-08-06

## 2018-08-06 RX ORDER — DEXAMETHASONE SODIUM PHOSPHATE 10 MG/ML
INJECTION, SOLUTION INTRAMUSCULAR; INTRAVENOUS PRN
Status: DISCONTINUED | OUTPATIENT
Start: 2018-08-06 | End: 2018-08-06

## 2018-08-06 RX ORDER — AMOXICILLIN 250 MG
1-2 CAPSULE ORAL 2 TIMES DAILY
Qty: 30 TABLET | Refills: 0 | Status: SHIPPED | OUTPATIENT
Start: 2018-08-06 | End: 2018-08-21

## 2018-08-06 RX ORDER — ONDANSETRON 2 MG/ML
INJECTION INTRAMUSCULAR; INTRAVENOUS PRN
Status: DISCONTINUED | OUTPATIENT
Start: 2018-08-06 | End: 2018-08-06

## 2018-08-06 RX ORDER — PROPOFOL 10 MG/ML
INJECTION, EMULSION INTRAVENOUS PRN
Status: DISCONTINUED | OUTPATIENT
Start: 2018-08-06 | End: 2018-08-06

## 2018-08-06 RX ORDER — LABETALOL HYDROCHLORIDE 5 MG/ML
10 INJECTION, SOLUTION INTRAVENOUS
Status: DISCONTINUED | OUTPATIENT
Start: 2018-08-06 | End: 2018-08-06 | Stop reason: HOSPADM

## 2018-08-06 RX ORDER — SCOLOPAMINE TRANSDERMAL SYSTEM 1 MG/1
1 PATCH, EXTENDED RELEASE TRANSDERMAL ONCE
Status: COMPLETED | OUTPATIENT
Start: 2018-08-06 | End: 2018-08-06

## 2018-08-06 RX ORDER — OXYCODONE HYDROCHLORIDE 5 MG/1
5 TABLET ORAL EVERY 4 HOURS PRN
Status: DISCONTINUED | OUTPATIENT
Start: 2018-08-06 | End: 2018-08-06 | Stop reason: HOSPADM

## 2018-08-06 RX ORDER — HYDROMORPHONE HYDROCHLORIDE 1 MG/ML
.3-.5 INJECTION, SOLUTION INTRAMUSCULAR; INTRAVENOUS; SUBCUTANEOUS EVERY 10 MIN PRN
Status: DISCONTINUED | OUTPATIENT
Start: 2018-08-06 | End: 2018-08-06 | Stop reason: HOSPADM

## 2018-08-06 RX ORDER — FENTANYL CITRATE 50 UG/ML
INJECTION, SOLUTION INTRAMUSCULAR; INTRAVENOUS PRN
Status: DISCONTINUED | OUTPATIENT
Start: 2018-08-06 | End: 2018-08-06

## 2018-08-06 RX ORDER — OXYCODONE HYDROCHLORIDE 5 MG/1
5-10 TABLET ORAL EVERY 6 HOURS PRN
Qty: 16 TABLET | Refills: 0 | Status: SHIPPED | OUTPATIENT
Start: 2018-08-06 | End: 2018-08-21

## 2018-08-06 RX ORDER — ALBUTEROL SULFATE 0.83 MG/ML
2.5 SOLUTION RESPIRATORY (INHALATION) EVERY 4 HOURS PRN
Status: DISCONTINUED | OUTPATIENT
Start: 2018-08-06 | End: 2018-08-06 | Stop reason: HOSPADM

## 2018-08-06 RX ORDER — ONDANSETRON 2 MG/ML
4 INJECTION INTRAMUSCULAR; INTRAVENOUS EVERY 30 MIN PRN
Status: DISCONTINUED | OUTPATIENT
Start: 2018-08-06 | End: 2018-08-06 | Stop reason: HOSPADM

## 2018-08-06 RX ADMIN — FENTANYL CITRATE 50 MCG: 50 INJECTION, SOLUTION INTRAMUSCULAR; INTRAVENOUS at 10:24

## 2018-08-06 RX ADMIN — PROPOFOL 200 MG: 10 INJECTION, EMULSION INTRAVENOUS at 08:11

## 2018-08-06 RX ADMIN — GLYCOPYRROLATE 0.4 MG: 0.2 INJECTION, SOLUTION INTRAMUSCULAR; INTRAVENOUS at 09:10

## 2018-08-06 RX ADMIN — KETOROLAC TROMETHAMINE 30 MG: 30 INJECTION, SOLUTION INTRAMUSCULAR at 09:00

## 2018-08-06 RX ADMIN — ROCURONIUM BROMIDE 20 MG: 10 INJECTION INTRAVENOUS at 08:23

## 2018-08-06 RX ADMIN — ONDANSETRON 4 MG: 4 TABLET, ORALLY DISINTEGRATING ORAL at 12:22

## 2018-08-06 RX ADMIN — Medication 100 MG: at 08:12

## 2018-08-06 RX ADMIN — ONDANSETRON 4 MG: 2 INJECTION INTRAMUSCULAR; INTRAVENOUS at 09:00

## 2018-08-06 RX ADMIN — DEXAMETHASONE SODIUM PHOSPHATE 10 MG: 10 INJECTION, SOLUTION INTRAMUSCULAR; INTRAVENOUS at 08:18

## 2018-08-06 RX ADMIN — SODIUM CHLORIDE, POTASSIUM CHLORIDE, SODIUM LACTATE AND CALCIUM CHLORIDE: 600; 310; 30; 20 INJECTION, SOLUTION INTRAVENOUS at 07:29

## 2018-08-06 RX ADMIN — Medication 2 MG: at 09:10

## 2018-08-06 RX ADMIN — FENTANYL CITRATE 50 MCG: 50 INJECTION, SOLUTION INTRAMUSCULAR; INTRAVENOUS at 10:15

## 2018-08-06 RX ADMIN — FENTANYL CITRATE 25 MCG: 50 INJECTION, SOLUTION INTRAMUSCULAR; INTRAVENOUS at 08:49

## 2018-08-06 RX ADMIN — OXYCODONE HYDROCHLORIDE 5 MG: 5 TABLET ORAL at 11:13

## 2018-08-06 RX ADMIN — FENTANYL CITRATE 25 MCG: 50 INJECTION, SOLUTION INTRAMUSCULAR; INTRAVENOUS at 08:40

## 2018-08-06 RX ADMIN — SCOPALAMINE 1 PATCH: 1 PATCH, EXTENDED RELEASE TRANSDERMAL at 07:29

## 2018-08-06 RX ADMIN — MIDAZOLAM 2 MG: 1 INJECTION INTRAMUSCULAR; INTRAVENOUS at 08:02

## 2018-08-06 RX ADMIN — LIDOCAINE HYDROCHLORIDE 40 MG: 20 INJECTION, SOLUTION INFILTRATION; PERINEURAL at 08:10

## 2018-08-06 RX ADMIN — CEFOXITIN SODIUM 2 G: 2 INJECTION, SOLUTION INTRAVENOUS at 08:18

## 2018-08-06 RX ADMIN — FENTANYL CITRATE 100 MCG: 50 INJECTION, SOLUTION INTRAMUSCULAR; INTRAVENOUS at 08:09

## 2018-08-06 RX ADMIN — FENTANYL CITRATE 50 MCG: 50 INJECTION, SOLUTION INTRAMUSCULAR; INTRAVENOUS at 10:06

## 2018-08-06 NOTE — ANESTHESIA POSTPROCEDURE EVALUATION
Patient: Dottie Rodriguez    Procedure(s):  LAPAROSCOPIC CHOLECYSTECTOMY - Wound Class: II-Clean Contaminated    Diagnosis:SYMPTOMATIC CHOLELITHIASIS  Diagnosis Additional Information: No value filed.    Anesthesia Type:  General, ETT    Note:  Anesthesia Post Evaluation    Patient location during evaluation: Bedside  Patient participation: Able to fully participate in evaluation  Level of consciousness: awake and alert  Pain management: adequate  Airway patency: patent  Cardiovascular status: acceptable and stable  Respiratory status: acceptable and room air  Hydration status: acceptable  PONV: none     Anesthetic complications: None          Last vitals:  Vitals:    08/06/18 1130 08/06/18 1145 08/06/18 1200   BP: 120/68 119/64 120/70   Resp: 18 18 18   Temp:      SpO2: 95% 94% 95%         Electronically Signed By: BRAD Noble CRNA  August 6, 2018  12:11 PM

## 2018-08-06 NOTE — OP NOTE
[place] [facility]ict[facility]    PREOPERATIVE DIAGNOSIS: Symptomatic cholelithiasis.     POSTOPERATIVE DIAGNOSES:  Symptomatic cholelithiasis    PROCEDURE: Laparoscopic cholecystectomy.    HISTORY: This 43 year old female developed abdominal pain and was seen in the emergency room. She was then seen by her PCP with and ultrasound showing cholelithiasis. As her symptoms were consistent with symptomatic cholelithiasis she was recommended to undergo cholecystectomy.    OPERATIVE FINDINGS:  The gallbladder contained multiple choleliths and was inflammed and edematous. There was no gross purulence.      DESCRIPTION OF PROCEDURE: With the patient in the supine position on the operating room table, general anesthesia was induced. The abdomen was prepped and draped sterilely and the requisite timeout pause observed during which her correct identity and planned procedure were confirmed by the operating room personnel in attendance. An infraumbilical curvilinear incision was made and carried through full thickness skin and subcutaneous tissue.  The fascia was identified and was incised and the abdomen was entered. The Che cannula was placed and a pneumoperitoneum was achieved with insufflation of carbon dioxide to 15 mmHg pressure and a 5 mm port site was placed in the epigastrium and a two 5 mm ports were placed subcostally at the midclavicular line and the anterior axillary line under direct vision. Inspection showed a somewhat thickened gallbladder wall with a few adhesions from the omentum to its anterior surface.  The gallbladder was full of multiple choleliths.   The gallbladder was grasped with ratcheted clamp and elevated cephalad. Using blunt dissection, the origin of the cystic duct was identified and the gallbladder neck was released from its reflection on the liver.   The cystic duct was skeletonized from the surrounding structures. The cystic duct was then skeletonized. With two tubular structures  entering the gallbladder and a critical view of safety obtained I proceeded with clipping.  The cystic duct was clipped and transected. The cystic artery was controlled with Ligaclips and divided. The gallbladder was taken down from its bed on the liver in an antegrade fashion with electrocautery dissection and it was retrieved intact in an endocatch bag through the umbilical port site without spillage.     The abdomen was irrigated with saline solution. There was mild bleeding from the gallbladder fossa which was controlled with electrocautery. The area was irrigated again and all of the fluid was aspirated. There was no further evidence of bleeding. The 5 mm working trocars were removed under direct visualization and pneumoperitoneum was released. The umbilical fascia was then closed with figure of eight 0 Vicryl sutures. The wounds were irrigated with saline and then injected with local anesthetic. The skin was reapproximated with 4-0 Monocryl. The incisions were then reinforced with Benzoin and steri-strips. Sterile dressings were applied and the patient was transported to the recovery room in satisfactory condition.     The estimated blood loss was 5 cc and there were no apparent intraoperative complications. The procedure was well tolerated and the patient left the operating room in good condition upon confirmation that the final sponge, needle and instruments counts were correct.  The post surgical debriefing was held and acknowledged at completion.    Tyshawn Ford MD  8/6/2018

## 2018-08-06 NOTE — OR NURSING
States pain minimal.Nausea eased.Patient and responsible adult given discharge instructions with no questions regarding instructions. Wilfrido score 19. Pain level 0/10.  Discharged from unit via w/c. Patient discharged to home.

## 2018-08-06 NOTE — DISCHARGE INSTRUCTIONS
ELECTIVE LAPAROSCOPIC CHOLECYSTECTOMY     ACTIVITY:    You may climb stairs.    You may lift or exercise when comfortable.    You may drive without restrictions when comfortable and not using prescription pain medications.    BATHING:    You may take a shower the day following surgery.  You may have steri-strips (looks like tape) on your incision.  They will fall off by themselves; if not, remove after one week.    DIET:    Starting with liquids, gradually return to the diet you were on before surgery.    CALL YOUR PHYSICIAN IF YOU HAVE:    Chills or fever about 101 F    Pain not relieved by your pain medication or rest    HELPFUL HINTS:    It is not uncommon to experience some loose stools or diarrhea after surgery.  This is your body s way of adapting.    You may experience shoulder pain which is due to the air placed within your abdomen during the procedure.  This is temporary and usually passes within 2 days.  If you have difficulty after surgery and are unable to contact your physician at his/her clinic, call the [facility] Emergency Room for assistance at [phone].Remove the scopolamine patch behind your right ear after 24 hours after application.   After removing the patch, wash your hands and the area behind your ear thoroughly with soap and water.   The patch will still contain some medicine after use.   To avoid accidental contact or ingestion by children or pets, fold the used patch in half with the sticky side together and throw away in the trash out of the reach of children and pets.         Post-Anesthesia Patient Instructions    IMMEDIATELY FOLLOWING SURGERY:  Do not drive or operate machinery for the first twenty four hours after surgery.  Do not make any important decisions for twenty four hours after surgery or while taking narcotic pain medications or sedatives.  If you develop intractable nausea and vomiting or a severe headache please notify your doctor immediately.    FOLLOW-UP:   Please make an appointment with your surgeon as instructed. You do not need to follow up with anesthesia unless specifically instructed to do so.    WOUND CARE INSTRUCTIONS (if applicable):  Keep a dry clean dressing on the anesthesia/puncture wound site if there is drainage.  Once the wound has quit draining you may leave it open to air.  Generally you should leave the bandage intact for twenty four hours unless there is drainage.  If the epidural site drains for more than 36-48 hours please call the anesthesia department.    QUESTIONS?:  Please feel free to call your physician or the hospital  if you have any questions, and they will be happy to assist you.

## 2018-08-06 NOTE — ANESTHESIA CARE TRANSFER NOTE
Patient: Dottie Rodriguez    Procedure(s):  LAPAROSCOPIC CHOLECYSTECTOMY - Wound Class: II-Clean Contaminated    Diagnosis: SYMPTOMATIC CHOLELITHIASIS  Diagnosis Additional Information: No value filed.    Anesthesia Type:   General, ETT     Note:  Airway :Nasal Cannula  Patient transferred to:PACU  Handoff Report: Identifed the Patient, Identified the Reponsible Provider, Reviewed the pertinent medical history, Discussed the surgical course, Reviewed Intra-OP anesthesia mangement and issues during anesthesia, Set expectations for post-procedure period and Allowed opportunity for questions and acknowledgement of understanding      Vitals: (Last set prior to Anesthesia Care Transfer)    CRNA VITALS  8/6/2018 0902 - 8/6/2018 0936      8/6/2018             Resp Rate (set): 8                Electronically Signed By: BRAD Sher CRNA  August 6, 2018  9:36 AM

## 2018-08-06 NOTE — IP AVS SNAPSHOT
MRN:[id number]                      After Visit Summary   8/6/2018    Dottie Rodriguez    MRN: [id number]           Thank you!     Thank you for choosing [facility] for your care. Our goal is always to provide you with excellent care. Hearing back from our patients is one way we can continue to improve our services. Please take a few minutes to complete the written survey that you may receive in the mail after you visit with us. Thank you!        Patient Information     Date Of Birth          1975        About your hospital stay     You were admitted on:  August 6, 2018 You last received care in the:  [facility] Preop/Phase II    You were discharged on:  August 6, 2018       Who to Call     For medical emergencies, please call 911.  For non-urgent questions about your medical care, please call your primary care provider or clinic, [phone]  For questions related to your surgery, please call your surgery clinic        Attending Provider     Provider Specialty    Tyshawn Ford MD Surgery       Primary Care Provider Office Phone # Fax #    Savanna Cunningham [phone]      After Care Instructions     Diet Instructions       Resume pre-procedure diet            Discharge Instructions       Follow up appointment as instructed by Surgeon and or RN            Dressing       Keep dressing clean and dry.  Dressing / incisional care as instructed by RN and or Surgeon. You may remove the bandaids on Wednesday. Once the band-aids are removed you can shower and let soap and water run over the incisions. Pat dry and do not scrub. The steri-strips will fall off on their own in approximately 1 week.            Ice to affected area       Ice to operative site PRN            No driving or operating machinery        While on narcotics            No lifting        No lifting over 20 lbs and no strenuous physical activity for 4 weeks            Shower       No shower for 48 hours post procedure.  May shower Postoperative Day (POD)  #2                  Your next 10 appointments already scheduled     Aug 21, 2018  9:00 AM CDT   (Arrive by 8:45 AM)   Post Op with Tyshawn Ford MD   [facility] ([facility] )    [address] [address]   [phone]              Further instructions from your care team             ELECTIVE LAPAROSCOPIC CHOLECYSTECTOMY     ACTIVITY:    You may climb stairs.    You may lift or exercise when comfortable.    You may drive without restrictions when comfortable and not using prescription pain medications.    BATHING:    You may take a shower the day following surgery.  You may have steri-strips (looks like tape) on your incision.  They will fall off by themselves; if not, remove after one week.    DIET:    Starting with liquids, gradually return to the diet you were on before surgery.    CALL YOUR PHYSICIAN IF YOU HAVE:    Chills or fever about 101 F    Pain not relieved by your pain medication or rest    HELPFUL HINTS:    It is not uncommon to experience some loose stools or diarrhea after surgery.  This is your body s way of adapting.    You may experience shoulder pain which is due to the air placed within your abdomen during the procedure.  This is temporary and usually passes within 2 days.  If you have difficulty after surgery and are unable to contact your physician at his/her clinic, call the hospital Emergency Room for assistance at [phone].Remove the scopolamine patch behind your right ear after 24 hours after application.   After removing the patch, wash your hands and the area behind your ear thoroughly with soap and water.   The patch will still contain some medicine after use.   To avoid accidental contact or ingestion by children or pets, fold the used patch in half with the sticky side together and throw away in the trash out of the reach of children and pets.         Post-Anesthesia Patient  "Instructions    IMMEDIATELY FOLLOWING SURGERY:  Do not drive or operate machinery for the first twenty four hours after surgery.  Do not make any important decisions for twenty four hours after surgery or while taking narcotic pain medications or sedatives.  If you develop intractable nausea and vomiting or a severe headache please notify your doctor immediately.    FOLLOW-UP:  Please make an appointment with your surgeon as instructed. You do not need to follow up with anesthesia unless specifically instructed to do so.    WOUND CARE INSTRUCTIONS (if applicable):  Keep a dry clean dressing on the anesthesia/puncture wound site if there is drainage.  Once the wound has quit draining you may leave it open to air.  Generally you should leave the bandage intact for twenty four hours unless there is drainage.  If the epidural site drains for more than 36-48 hours please call the anesthesia department.    QUESTIONS?:  Please feel free to call your physician or the hospital  if you have any questions, and they will be happy to assist you.       Pending Results     No orders found from 8/4/2018 to 8/7/2018.            Admission Information     Date & Time Provider Department Dept. Phone    8/6/2018 Tyshawn Ford MD HI Preop/Phase [phone]      Your Vitals Were     Blood Pressure Temperature Respirations Height Weight Pulse Oximetry    119/69 97.7  F (36.5  C) (Temporal) 16 1.702 m (5' 7.01\") 93.7 kg (206 lb 9.6 oz) 96%    BMI (Body Mass Index)                   32.35 kg/m2           [employer] Information     [employer] lets you send messages to your doctor, view your test results, renew your prescriptions, schedule appointments and more. To sign up, go to [email or web address] . Click on \"Log in\" on the left side of the screen, which will take you to the Welcome page. Then click on \"Sign up Now\" on the right side of the page.     You will be asked to enter the access code listed below, as well as some " personal information. Please follow the directions to create your username and password.     Your access code is: [email or web address]  Expires: 10/7/2018  8:20 AM     Your access code will  in 90 days. If you need help or a new code, please call your [facility] clinic or [phone].        Care EveryWhere ID     This is your Care EveryWhere ID. This could be used by other organizations to access your [facility] medical records  [id number]        Equal Access to Services     [facility] campusPAUL : Hadii crystal anton hadasho Soomaali, waaxda luqadaha, qaybta kaalmada adeegyada, luis enrique more . So [facility] [phone].    ATENCIÓN: Si habla español, tiene a venegas disposición servicios gratuitos de asistencia lingüística. Llame al [phone].    We comply with applicable federal civil rights laws and [place] laws. We do not discriminate on the basis of race, color, national origin, age, disability, sex, sexual orientation, or gender identity.               Review of your medicines      START taking        Dose / Directions    oxyCODONE IR 5 MG tablet   Commonly known as:  ROXICODONE   Used for:  Symptomatic cholelithiasis        Dose:  5-10 mg   Take 1-2 tablets (5-10 mg) by mouth every 6 hours as needed for pain or other (Moderate to Severe)   Quantity:  16 tablet   Refills:  0       senna-docusate 8.6-50 MG per tablet   Commonly known as:  SENOKOT-S;PERICOLACE   Used for:  Symptomatic cholelithiasis        Dose:  1-2 tablet   Take 1-2 tablets by mouth 2 times daily Take while on oral narcotics to prevent or treat constipation.   Quantity:  30 tablet   Refills:  0         CONTINUE these medicines which have NOT CHANGED        Dose / Directions    * HERBALS        Dose:  2 each   Take 2 each by mouth daily Protandim NRF-1 2 capsules by mouth daily   Refills:  0       * HERBALS        Dose:  1 each   Take 1 each by mouth Protandim NRF-2 1 tablet by mouth daily   Refills:  0       PROBIOTIC DAILY PO         Refills:  0       * Notice:  This list has 2 medication(s) that are the same as other medications prescribed for you. Read the directions carefully, and ask your doctor or other care provider to review them with you.         Where to get your medicines      These medications were sent to [facility] [place], [place] - [address] [place] [address] [address] [place] AT [facility] [address]  [address] [place] [address]     Phone:  [phone]     senna-docusate 8.6-50 MG per tablet         Some of these will need a paper prescription and others can be bought over the counter. Ask your nurse if you have questions.     Bring a paper prescription for each of these medications     oxyCODONE IR 5 MG tablet                Protect others around you: Learn how to safely use, store and throw away your medicines at www.disposemymeds.org.        Information about OPIOIDS     PRESCRIPTION OPIOIDS: WHAT YOU NEED TO KNOW   We gave you an opioid (narcotic) pain medicine. It is important to manage your pain, but opioids are not always the best choice. You should first try all the other options your care team gave you. Take this medicine for as short a time (and as few doses) as possible.     These medicines have risks:    DO NOT drive when on new or higher doses of pain medicine. These medicines can affect your alertness and reaction times, and you could be arrested for driving under the influence (DUI). If you need to use opioids long-term, talk to your care team about driving.    DO NOT operate heave machinery    DO NOT do any other dangerous activities while taking these medicines.     DO NOT drink any alcohol while taking these medicines.      If the opioid prescribed includes acetaminophen, DO NOT take with any other medicines that contain acetaminophen. Read all labels carefully. Look for the word  acetaminophen  or  Tylenol.  Ask your pharmacist if you have questions or are unsure.    You can get addicted to pain  medicines, especially if you have a history of addiction (chemical, alcohol or substance dependence). Talk to your care team about ways to reduce this risk.    Store your pills in a secure place, locked if possible. We will not replace any lost or stolen medicine. If you don t finish your medicine, please throw away (dispose) as directed by your pharmacist. The [place] Pollution Control Agency has more information about safe disposal: https://www.pca.Replaced by [facility].[place]/living-green/managing-unwanted-medications.     All opioids tend to cause constipation. Drink plenty of water and eat foods that have a lot of fiber, such as fruits, vegetables, prune juice, apple juice and high-fiber cereal. Take a laxative (Miralax, milk of magnesia, Colace, Senna) if you don t move your bowels at least every other day.              Medication List: This is a list of all your medications and when to take them. Check marks below indicate your daily home schedule. Keep this list as a reference.      Medications           Morning Afternoon Evening Bedtime As Needed    * HERBALS   Take 2 each by mouth daily Protandim NRF-1 2 capsules by mouth daily                                * HERBALS   Take 1 each by mouth Protandim NRF-2 1 tablet by mouth daily                                oxyCODONE IR 5 MG tablet   Commonly known as:  ROXICODONE   Take 1-2 tablets (5-10 mg) by mouth every 6 hours as needed for pain or other (Moderate to Severe)   Last time this was given:  5 mg on 8/6/2018 11:13 AM                                PROBIOTIC DAILY PO                                senna-docusate 8.6-50 MG per tablet   Commonly known as:  SENOKOT-S;PERICOLACE   Take 1-2 tablets by mouth 2 times daily Take while on oral narcotics to prevent or treat constipation.                                * Notice:  This list has 2 medication(s) that are the same as other medications prescribed for you. Read the directions carefully, and ask your doctor or other  care provider to review them with you.

## 2018-08-06 NOTE — IP AVS SNAPSHOT
HI Preop/Phase II    [address] [address]    Phone:  [phone]                                       After Visit Summary   8/6/2018    Dottie Rodriguez    MRN: [id number]           After Visit Summary Signature Page     I have received my discharge instructions, and my questions have been answered. I have discussed any challenges I see with this plan with the nurse or doctor.    ..........................................................................................................................................  Patient/Patient Representative Signature      ..........................................................................................................................................  Patient Representative Print Name and Relationship to Patient    ..................................................               ................................................  Date                                            Time    ..........................................................................................................................................  Reviewed by Signature/Title    ...................................................              ..............................................  Date                                                            Time

## 2018-08-07 LAB — COPATH REPORT: NORMAL

## 2018-08-07 NOTE — PHARMACY
Accessed the patient's chart to complete the weekly pharmacy controlled substance audit.  Yoselyn Brown, Pharm.D.

## 2018-08-21 ENCOUNTER — OFFICE VISIT (OUTPATIENT)
Dept: SURGERY | Facility: OTHER | Age: 43
End: 2018-08-21
Attending: SURGERY
Payer: COMMERCIAL

## 2018-08-21 VITALS
DIASTOLIC BLOOD PRESSURE: 70 MMHG | BODY MASS INDEX: 32.65 KG/M2 | HEART RATE: 64 BPM | OXYGEN SATURATION: 98 % | HEIGHT: 67 IN | WEIGHT: 208 LBS | TEMPERATURE: 98.1 F | SYSTOLIC BLOOD PRESSURE: 102 MMHG

## 2018-08-21 DIAGNOSIS — Z87.898 NO POST-OP COMPLICATIONS: Primary | ICD-10-CM

## 2018-08-21 PROCEDURE — 99024 POSTOP FOLLOW-UP VISIT: CPT | Performed by: SURGERY

## 2018-08-21 ASSESSMENT — PAIN SCALES - GENERAL: PAINLEVEL: NO PAIN (0)

## 2018-08-21 NOTE — PATIENT INSTRUCTIONS
Thank you for allowing Dr. Ford and our surgical team to participate in your care. Please call with any scheduling questions or concerns to our health unit coordinator at [phone] or for nursing questions to nurse at [phone].

## 2018-08-21 NOTE — MR AVS SNAPSHOT
"              After Visit Summary   8/21/2018    Dottie Rodriguez    MRN: [id number]           Patient Information     Date Of Birth          1975        Visit Information        Provider Department      8/21/2018 9:00 AM Tyshawn Ford MD [facility]        Care Instructions    Thank you for allowing Dr. Ford and our surgical team to participate in your care. Please call with any scheduling questions or concerns to our health unit coordinator at [phone] or for nursing questions to nurse at [phone].            Follow-ups after your visit        Who to contact     If you have questions or need follow up information about today's clinic visit or your schedule please contact [facility] directly at [phone].  Normal or non-critical lab and imaging results will be communicated to you by MyChart, letter or phone within 4 business days after the clinic has received the results. If you do not hear from us within 7 days, please contact the clinic through MyChart or phone. If you have a critical or abnormal lab result, we will notify you by phone as soon as possible.  Submit refill requests through [employer] or call your pharmacy and they will forward the refill request to us. Please allow 3 business days for your refill to be completed.          Additional Information About Your Visit        Care EveryWhere ID     This is your Care EveryWhere ID. This could be used by other organizations to access your [place] medical records  [id number]        Your Vitals Were     Pulse Temperature Height Pulse Oximetry BMI (Body Mass Index)       64 98.1  F (36.7  C) 5' 7\" (1.702 m) 98% 32.58 kg/m2        Blood Pressure from Last 3 Encounters:   08/21/18 102/70   08/06/18 137/85   08/02/18 92/62    Weight from Last 3 Encounters:   08/21/18 208 lb (94.3 kg)   08/06/18 206 lb 9.6 oz (93.7 kg)   08/02/18 204 lb (92.5 kg)              Today, you had the following     No orders found for " display       Primary Care Provider Office Phone # Fax #    Savanna Cunningham [phone] [facility] [address]        Equal Access to Services     ASAD LOJA : Hadii aad ku hadmerlineanalilia Karenmarekali, wablakeda luqbrissa, qaybta kakhalidada kaitlyn, luis enrique matias stephenaristeo nancyashlyalexander westbrook. So [facility] [place] [phone].    ATENCIÓN: Si habla español, tiene a venegas disposición servicios gratuitos de asistencia lingüística. Llame al [phone].    We comply with applicable federal civil rights laws and [place] laws. We do not discriminate on the basis of race, color, national origin, age, disability, sex, sexual orientation, or gender identity.            Thank you!     Thank you for choosing [facility]  for your care. Our goal is always to provide you with excellent care. Hearing back from our patients is one way we can continue to improve our services. Please take a few minutes to complete the written survey that you may receive in the mail after your visit with us. Thank you!             Your Updated Medication List - Protect others around you: Learn how to safely use, store and throw away your medicines at www.disposemymeds.org.          This list is accurate as of 8/21/18  9:37 AM.  Always use your most recent med list.                   Brand Name Dispense Instructions for use Diagnosis    * HERBALS      Take 2 each by mouth daily Protandim NRF-1 2 capsules by mouth daily        * HERBALS      Take 1 each by mouth Protandim NRF-2 1 tablet by mouth daily        PROBIOTIC DAILY PO           * Notice:  This list has 2 medication(s) that are the same as other medications prescribed for you. Read the directions carefully, and ask your doctor or other care provider to review them with you.

## 2018-08-21 NOTE — PROGRESS NOTES
HPI:  Returns for her first post surgical examination following laparoscopic cholecystectomy without complaint.  Denies wound complication, fever, chills, nausea or vomiting. She has been tolerating a diet with no problems. She has no pain issues and says she is feeling much better.   ROS:   10 point ROS neg other than the symptoms noted above in the HPI.    Examination:  There were no vitals taken for this visit.    Constitutional: healthy, alert and no distress  Abdomen:  Soft, NT/ND  Wound(s) healing nicely without evidence of infection. Incision is clean/dry/intact. Fascial closure(s) intact without defect.  Pathology:     SPECIMEN(S):   Gallbladder and contents     FINAL DIAGNOSIS:   Gallbladder, cholecystectomy   - Acute and chronic cholecystitis with cholelithiasis   - Reactive lymph node     Impression:   44 yo female s/p laparoscopic cholecystectomy.  Recommendations:  The technical aspects of the procedure and operative findings were reviewed.  She was instructed that she may resume her regular activities and let her body be the guide. If she has any questions or concerns she can call our office. She may be discharged from the surgical clinic with no follow up needed.  yTshawn Ford MD    8/21/2018  9:04 AM

## 2018-08-21 NOTE — NURSING NOTE
"Chief Complaint   Patient presents with     Surgical Followup     lap santino 8/6/18       Initial /70  Pulse 64  Temp 98.1  F (36.7  C)  Ht 5' 7\" (1.702 m)  Wt 208 lb (94.3 kg)  SpO2 98%  BMI 32.58 kg/m2 Estimated body mass index is 32.58 kg/(m^2) as calculated from the following:    Height as of this encounter: 5' 7\" (1.702 m).    Weight as of this encounter: 208 lb (94.3 kg).  Medication Reconciliation: complete    KHALIDA MCFARLANE LPN    "

## 2018-11-30 ENCOUNTER — OFFICE VISIT (OUTPATIENT)
Dept: FAMILY MEDICINE | Facility: OTHER | Age: 43
End: 2018-11-30
Attending: FAMILY MEDICINE
Payer: COMMERCIAL

## 2018-11-30 VITALS
HEART RATE: 57 BPM | DIASTOLIC BLOOD PRESSURE: 72 MMHG | WEIGHT: 205.8 LBS | RESPIRATION RATE: 18 BRPM | HEIGHT: 67 IN | SYSTOLIC BLOOD PRESSURE: 128 MMHG | OXYGEN SATURATION: 97 % | TEMPERATURE: 96.9 F | BODY MASS INDEX: 32.3 KG/M2

## 2018-11-30 DIAGNOSIS — N64.4 BREAST PAIN: Primary | ICD-10-CM

## 2018-11-30 DIAGNOSIS — Z91.018 NUT ALLERGY: ICD-10-CM

## 2018-11-30 PROCEDURE — 99213 OFFICE O/P EST LOW 20 MIN: CPT | Performed by: FAMILY MEDICINE

## 2018-11-30 RX ORDER — EPINEPHRINE 0.3 MG/.3ML
0.3 INJECTION SUBCUTANEOUS PRN
Qty: 0.6 ML | Refills: 1 | Status: SHIPPED | OUTPATIENT
Start: 2018-11-30

## 2018-11-30 ASSESSMENT — ANXIETY QUESTIONNAIRES
6. BECOMING EASILY ANNOYED OR IRRITABLE: NOT AT ALL
IF YOU CHECKED OFF ANY PROBLEMS ON THIS QUESTIONNAIRE, HOW DIFFICULT HAVE THESE PROBLEMS MADE IT FOR YOU TO DO YOUR WORK, TAKE CARE OF THINGS AT HOME, OR GET ALONG WITH OTHER PEOPLE: NOT DIFFICULT AT ALL
2. NOT BEING ABLE TO STOP OR CONTROL WORRYING: NOT AT ALL
7. FEELING AFRAID AS IF SOMETHING AWFUL MIGHT HAPPEN: NOT AT ALL
1. FEELING NERVOUS, ANXIOUS, OR ON EDGE: NOT AT ALL
GAD7 TOTAL SCORE: 0
5. BEING SO RESTLESS THAT IT IS HARD TO SIT STILL: NOT AT ALL
3. WORRYING TOO MUCH ABOUT DIFFERENT THINGS: NOT AT ALL

## 2018-11-30 ASSESSMENT — PATIENT HEALTH QUESTIONNAIRE - PHQ9
SUM OF ALL RESPONSES TO PHQ QUESTIONS 1-9: 0
5. POOR APPETITE OR OVEREATING: NOT AT ALL

## 2018-11-30 ASSESSMENT — PAIN SCALES - GENERAL: PAINLEVEL: NO PAIN (0)

## 2018-11-30 NOTE — PROGRESS NOTES
SUBJECTIVE:                                                    Dottie Rodriguez is a 43 year old female who presents to clinic today for the following health issues:        Breast problem      Duration: couple months, has since subsided    Description (location/character/radiation): Right breast pain    Intensity:  Has subsided    Accompanying signs and symptoms: Worse during menstrual cycle or leading up to    History (similar episodes/previous evaluation): None    Precipitating or alleviating factors: None    Therapies tried and outcome: None     Has more nut allergies, wants to discuss epi-pen.  Recent reaction to almonds and a few other items.  All added to list.        Problem list and histories reviewed & adjusted, as indicated.  Additional history: as documented    Patient Active Problem List   Diagnosis     ACP (advance care planning)     Past Surgical History:   Procedure Laterality Date     COLONOSCOPY  8/18/16     ENT SURGERY      T & A     GYN SURGERY      c section x 2     LAPAROSCOPIC CHOLECYSTECTOMY N/A 8/6/2018    Procedure: LAPAROSCOPIC CHOLECYSTECTOMY;  LAPAROSCOPIC CHOLECYSTECTOMY;  Surgeon: Tyshawn Ford MD;  Location: [facility]       Social History   Substance Use Topics     Smoking status: Never Smoker     Smokeless tobacco: Never Used     Alcohol use 0.0 oz/week     0 Standard drinks or equivalent per week      Comment: social     Family History   Problem Relation Age of Onset     Hypertension Mother      Rheumatoid Arthritis Mother      Hyperlipidemia Father          Current Outpatient Prescriptions   Medication Sig Dispense Refill     EPINEPHrine (EPIPEN/ADRENACLICK/OR ANY BX GENERIC EQUIV) 0.3 MG/0.3ML injection 2-pack Inject 0.3 mLs (0.3 mg) into the muscle as needed for anaphylaxis 0.6 mL 1     HERBALS Take 2 each by mouth daily Protandim NRF-1 2 capsules by mouth daily       HERBALS Take 1 each by mouth Protandim NRF-2 1 tablet by mouth daily       Probiotic Product (PROBIOTIC DAILY  "PO)        Allergies   Allergen Reactions     Apples [Apple] Shortness Of Breath     Nuts Shortness Of Breath     Pineapple Itching     Ciprofloxacin Nausea and Vomiting     Carrots [Daucus Carota] Itching     Cherry Itching     Peaches [Prunus Persica] Itching     Spinach      Can eat the organic        ROS:  Constitutional, HEENT, cardiovascular, pulmonary, gi and gu systems are negative, except as otherwise noted.    OBJECTIVE:     /72 (BP Location: Left arm, Patient Position: Sitting, Cuff Size: Adult Regular)  Pulse 57  Temp 96.9  F (36.1  C) (Tympanic)  Resp 18  Ht 5' 7\" (1.702 m)  Wt 205 lb 12.8 oz (93.4 kg)  SpO2 97%  BMI 32.23 kg/m2  Body mass index is 32.23 kg/(m^2).  GENERAL: healthy, alert and no distress  PSYCH: mentation appears normal, affect normal/bright  Patient declines breast exam today as she no longer has symptoms and she has not felt any masses        ASSESSMENT/PLAN:     1. Breast pain  Will order mammogram for evaluation.  I think she likely has some fibrocystic changes leading to symptoms.  Follow-up with results when available.  - MA Diagnostic Bilateral w/González; Future    2. Nut allergy  Epipen given, and patient is counseled to go to ER if pen is used.  Also, I recommended she keep liquid Benadryl on hand for mild symptoms.  Follow-up as needed.  - EPINEPHrine (EPIPEN/ADRENACLICK/OR ANY BX GENERIC EQUIV) 0.3 MG/0.3ML injection 2-pack; Inject 0.3 mLs (0.3 mg) into the muscle as needed for anaphylaxis  Dispense: 0.6 mL; Refill: 1    Patient will consider flu vaccine.    Savanna Cunningham MD  [facility]                 "

## 2018-11-30 NOTE — NURSING NOTE
"Chief Complaint   Patient presents with     Breast Problem       Initial /72 (BP Location: Left arm, Patient Position: Sitting, Cuff Size: Adult Regular)  Pulse 57  Temp 96.9  F (36.1  C) (Tympanic)  Resp 18  Ht 5' 7\" (1.702 m)  Wt 205 lb 12.8 oz (93.4 kg)  SpO2 97%  BMI 32.23 kg/m2 Estimated body mass index is 32.23 kg/(m^2) as calculated from the following:    Height as of this encounter: 5' 7\" (1.702 m).    Weight as of this encounter: 205 lb 12.8 oz (93.4 kg).  Medication Reconciliation: complete    Joanna Coulter MA  "

## 2018-11-30 NOTE — MR AVS SNAPSHOT
"              After Visit Summary   11/30/2018    Dottie Rodriguez    MRN: [id number]           Patient Information     Date Of Birth          1975        Visit Information        Provider Department      11/30/2018 9:45 AM Savanna Cunningham MD [facility]        Today's Diagnoses     Breast pain    -  1    Nut allergy           Follow-ups after your visit        Follow-up notes from your care team     Return if symptoms worsen or fail to improve.      Future tests that were ordered for you today     Open Future Orders        Priority Expected Expires Ordered    MA Diagnostic Bilateral w/González Routine  11/30/2019 11/30/2018            Who to contact     If you have questions or need follow up information about today's clinic visit or your schedule please contact [facility] directly at [phone].  Normal or non-critical lab and imaging results will be communicated to you by MyChart, letter or phone within 4 business days after the clinic has received the results. If you do not hear from us within 7 days, please contact the clinic through MyChart or phone. If you have a critical or abnormal lab result, we will notify you by phone as soon as possible.  Submit refill requests through [employer] or call your pharmacy and they will forward the refill request to us. Please allow 3 business days for your refill to be completed.          Additional Information About Your Visit        Care EveryWhere ID     This is your Care EveryWhere ID. This could be used by other organizations to access your [facility] medical records  [id number]        Your Vitals Were     Pulse Temperature Respirations Height Pulse Oximetry BMI (Body Mass Index)    57 96.9  F (36.1  C) (Tympanic) 18 5' 7\" (1.702 m) 97% 32.23 kg/m2       Blood Pressure from Last 3 Encounters:   11/30/18 128/72   08/21/18 102/70   08/06/18 137/85    Weight from Last 3 Encounters:   11/30/18 205 lb 12.8 oz (93.4 kg) "   08/21/18 208 lb (94.3 kg)   08/06/18 206 lb 9.6 oz (93.7 kg)                 Today's Medication Changes          These changes are accurate as of 11/30/18  1:17 PM.  If you have any questions, ask your nurse or doctor.               Start taking these medicines.        Dose/Directions    EPINEPHrine 0.3 MG/0.3ML injection 2-pack   Commonly known as:  EPIPEN/ADRENACLICK/or ANY BX GENERIC EQUIV   Used for:  Nut allergy   Started by:  Savanna Cunningham MD        Dose:  0.3 mg   Inject 0.3 mLs (0.3 mg) into the muscle as needed for anaphylaxis   Quantity:  0.6 mL   Refills:  1            Where to get your medicines      These medications were sent to [facility] [address] [place] [address]  AT [facility] [address] [place] [address]     Phone:  [phone]     EPINEPHrine 0.3 MG/0.3ML injection 2-pack                Primary Care Provider Office Phone # Fax #    Savanna Cunningham [phone] [address] [facility] [address]        Equal Access to Services     NANDO LOJA : Hadii crystal ku hadasho Soomaali, waaxda luqadaha, qaybta kaalmada adeegyada, luis enrique [place]. So [facility] [phone].    ATENCIÓN: Si habla español, tiene a venegas disposición servicios gratuitos de asistencia lingüística. [facility] [phone].    We comply with applicable federal civil rights laws and [place] laws. We do not discriminate on the basis of race, color, national origin, age, disability, sex, sexual orientation, or gender identity.            Thank you!     Thank you for choosing [facility]  for your care. Our goal is always to provide you with excellent care. Hearing back from our patients is one way we can continue to improve our services. Please take a few minutes to complete the written survey that you may receive in the mail after your visit with us. Thank you!             Your Updated  Medication List - Protect others around you: Learn how to safely use, store and throw away your medicines at www.disposemymeds.org.          This list is accurate as of 11/30/18  1:17 PM.  Always use your most recent med list.                   Brand Name Dispense Instructions for use Diagnosis    EPINEPHrine 0.3 MG/0.3ML injection 2-pack    EPIPEN/ADRENACLICK/or ANY BX GENERIC EQUIV    0.6 mL    Inject 0.3 mLs (0.3 mg) into the muscle as needed for anaphylaxis    Nut allergy       * HERBALS      Take 2 each by mouth daily Protandim NRF-1 2 capsules by mouth daily        * HERBALS      Take 1 each by mouth Protandim NRF-2 1 tablet by mouth daily        PROBIOTIC DAILY PO           * Notice:  This list has 2 medication(s) that are the same as other medications prescribed for you. Read the directions carefully, and ask your doctor or other care provider to review them with you.

## 2018-12-01 ASSESSMENT — ANXIETY QUESTIONNAIRES: GAD7 TOTAL SCORE: 0

## 2018-12-14 ENCOUNTER — TELEPHONE (OUTPATIENT)
Dept: FAMILY MEDICINE | Facility: OTHER | Age: 43
End: 2018-12-14

## 2018-12-27 ENCOUNTER — ANCILLARY PROCEDURE (OUTPATIENT)
Dept: MAMMOGRAPHY | Facility: OTHER | Age: 43
End: 2018-12-27
Attending: FAMILY MEDICINE
Payer: COMMERCIAL

## 2018-12-27 ENCOUNTER — HOSPITAL ENCOUNTER (OUTPATIENT)
Dept: ULTRASOUND IMAGING | Facility: HOSPITAL | Age: 43
Discharge: HOME | End: 2018-12-27
Attending: FAMILY MEDICINE | Admitting: FAMILY MEDICINE
Payer: COMMERCIAL

## 2018-12-27 DIAGNOSIS — N64.4 BREAST PAIN: ICD-10-CM

## 2018-12-27 PROCEDURE — 77066 DX MAMMO INCL CAD BI: CPT | Mod: TC

## 2018-12-27 PROCEDURE — 76642 ULTRASOUND BREAST LIMITED: CPT | Mod: TC,RT

## 2018-12-27 PROCEDURE — G0279 TOMOSYNTHESIS, MAMMO: HCPCS | Mod: TC

## 2018-12-27 NOTE — LETTER
Dottie Rodriguez  [address] [facility] [address]    December 27, 2018  Date of Exam: 12/27/18    Dear Dottie:    Thank you for your recent visit.  Breast Imaging Result: We are pleased to inform you that the results of your recent breast imaging show no evidence of malignancy (cancer).    Breast Density: Your mammogram shows that you have dense breast tissue. This means you have a slightly higher risk of getting breast cancer. It also means your mammograms will be harder to read but it doesn't mean that mammograms aren t useful. In fact, yearly mammograms are even more important for women at higher risk.    If you are experiencing any breast problems such as a lump or localized pain we request that you discuss this with your health care provider if you haven t already done so, as additional testing may be necessary.    As you know, early detection of cancer is very important. Although mammography is the most accurate method for early detection, not all cancers are found through mammography. A thorough examination includes a combination of mammography, physical examination and breast self-examination. Currently the American College of Radiology and the Society of Breast Imaging recommend an annual mammogram for all women beginning at the age of 40.    A report of your breast imaging results was sent to: Savanna Cunningham    Your breast imaging will become part of your medical file here at [facility] for at least 10 years. You are responsible for informing any new health care provider or breast imaging facility of the date and location of this examination.    We appreciate the opportunity to participate in your health care.    Sincerely,    Dalton Elizabeth MD  Interpreting Radiologist  [facility]

## 2019-03-19 ENCOUNTER — TELEPHONE (OUTPATIENT)
Dept: FAMILY MEDICINE | Facility: OTHER | Age: 44
End: 2019-03-19

## 2019-03-19 DIAGNOSIS — T75.3XXS MOTION SICKNESS, SEQUELA: Primary | ICD-10-CM

## 2019-03-19 RX ORDER — SCOLOPAMINE TRANSDERMAL SYSTEM 1 MG/1
1 PATCH, EXTENDED RELEASE TRANSDERMAL
Qty: 4 PATCH | Refills: 0 | Status: SHIPPED | OUTPATIENT
Start: 2019-03-19 | End: 2021-08-25

## 2019-03-19 NOTE — TELEPHONE ENCOUNTER
Patient will be leaving for [place] and will be going on a boat.  She does get motion sick.  Scopolamine patches sent.

## 2019-08-07 NOTE — TELEPHONE ENCOUNTER
Received a written order via fax from Dr. Alexus Evans. Depo added to patients med list and order sent to be scanned.   Therapy plan signed.

## 2020-01-10 ENCOUNTER — ALLIED HEALTH/NURSE VISIT (OUTPATIENT)
Dept: FAMILY MEDICINE | Facility: OTHER | Age: 45
End: 2020-01-10
Attending: FAMILY MEDICINE
Payer: COMMERCIAL

## 2020-01-10 DIAGNOSIS — Z23 NEED FOR PROPHYLACTIC VACCINATION AND INOCULATION AGAINST INFLUENZA: Primary | ICD-10-CM

## 2020-01-10 PROCEDURE — 90471 IMMUNIZATION ADMIN: CPT

## 2020-01-10 PROCEDURE — 90686 IIV4 VACC NO PRSV 0.5 ML IM: CPT

## 2020-09-18 ENCOUNTER — TELEPHONE (OUTPATIENT)
Dept: FAMILY MEDICINE | Facility: OTHER | Age: 45
End: 2020-09-18

## 2020-09-18 NOTE — LETTER
[facility]  [address]  [facility] [address]  Phone: [phone]    September 18, 2020        Dottie Rodriguez  [address] [address]          To whom it may concern:    RE: Dottie Rodriguez    Patient's daughter was tested for COVID on 9/17/2020 due to symptoms.    Please contact me for questions or concerns.      Sincerely,        Savanna Cunningham MD

## 2020-09-18 NOTE — TELEPHONE ENCOUNTER
Pt called, reports that she needs a letter for her employer stating that her daughter More was experiencing covid symptoms and was tested on 9/17. Requesting that letter be sent through [employer]. Is this okay? If so, I will send letter.

## 2020-09-20 ENCOUNTER — NURSE TRIAGE (OUTPATIENT)
Dept: NURSING | Facility: CLINIC | Age: 45
End: 2020-09-20

## 2020-09-20 NOTE — TELEPHONE ENCOUNTER
Patient requesting a letter stating her daughter (More Rodriguez, 12/10/2010) tested negative for COVID.  Pt needs this letter ASAP so she can return to work as a .  Pt states her daughter's school policy is that pt can return to school with a negative test.      Pt is requesting that this letter is sent to her [employer] or her e-mail address (confirmed in demographics).  She does not have access to More's [employer] as of yet.     Please call pt back ASAP, states she left a message with the nurse on Friday and did not hear back.     Yamileth Burgos RN/HARDY    Reason for Disposition    [1] Follow-up call from patient regarding patient's clinical status AND [2] information NON-URGENT    Additional Information    Negative: ED call to PCP    Negative: Physician call to PCP    Negative: Call about patient who is currently hospitalized    Negative: Lab or radiology calling with CRITICAL test results    Negative: [1] Prescription not at pharmacy AND [2] was prescribed today by PCP    Negative: [1] Follow-up call from patient regarding patient's clinical status AND [2] information urgent    Negative: [1] Caller requests to speak ONLY to PCP AND [2] urgent question    Negative: [1] Caller requests to speak to PCP now AND [2] won't tell us reason for call  (Exception: if 10 pm to 6 am, caller must first discuss reason for the call)    Negative: Notification of hospital admission    Negative: Notification of death    Negative: Caller requesting lab results    Negative: Lab or radiology calling with test results    Protocols used: [facility] - [facility]

## 2020-09-20 NOTE — LETTER
[facility]  [address] [address]  Phone: [phone]    September 21, 2020        Dottie Rodriguez  [address] [address]          To whom it may concern:    RE: Dottie Rodriguez    Patient's daughter was tested for COVID, and results were negative.  Patient may return to work.    Please contact me for questions or concerns.      Sincerely,        Savanna Cunningham MD

## 2020-10-19 ENCOUNTER — OFFICE VISIT (OUTPATIENT)
Dept: FAMILY MEDICINE | Facility: OTHER | Age: 45
End: 2020-10-19
Attending: FAMILY MEDICINE
Payer: COMMERCIAL

## 2020-10-19 DIAGNOSIS — Z23 NEED FOR PROPHYLACTIC VACCINATION AND INOCULATION AGAINST INFLUENZA: Primary | ICD-10-CM

## 2020-10-19 PROCEDURE — 90471 IMMUNIZATION ADMIN: CPT

## 2020-10-19 PROCEDURE — 90686 IIV4 VACC NO PRSV 0.5 ML IM: CPT

## 2020-12-20 ENCOUNTER — HEALTH MAINTENANCE LETTER (OUTPATIENT)
Age: 45
End: 2020-12-20

## 2021-02-28 ENCOUNTER — HEALTH MAINTENANCE LETTER (OUTPATIENT)
Age: 46
End: 2021-02-28

## 2021-05-05 ENCOUNTER — ANCILLARY PROCEDURE (OUTPATIENT)
Dept: MAMMOGRAPHY | Facility: OTHER | Age: 46
End: 2021-05-05
Attending: FAMILY MEDICINE
Payer: COMMERCIAL

## 2021-05-05 DIAGNOSIS — Z12.31 VISIT FOR SCREENING MAMMOGRAM: ICD-10-CM

## 2021-05-05 PROCEDURE — 77063 BREAST TOMOSYNTHESIS BI: CPT | Mod: TC | Performed by: RADIOLOGY

## 2021-05-05 PROCEDURE — 77067 SCR MAMMO BI INCL CAD: CPT | Mod: TC | Performed by: RADIOLOGY

## 2021-08-12 ENCOUNTER — TELEPHONE (OUTPATIENT)
Dept: FAMILY MEDICINE | Facility: OTHER | Age: 46
End: 2021-08-12

## 2021-08-12 NOTE — TELEPHONE ENCOUNTER
8:23 AM    Reason for Call: OVERBOOK    Patient is having the following symptoms: Foot pain / tendon / broken?      The patient is requesting an appointment for Friday AM with  Dr Fernandez    Was an appointment offered for this call?   No    Preferred method for responding to this message:  [phone]    If we cannot reach you directly, may we leave a detailed response at the number you provided?  Yes      Pia Conrad

## 2021-08-13 ENCOUNTER — OFFICE VISIT (OUTPATIENT)
Dept: FAMILY MEDICINE | Facility: OTHER | Age: 46
End: 2021-08-13
Attending: FAMILY MEDICINE
Payer: COMMERCIAL

## 2021-08-13 ENCOUNTER — ANCILLARY PROCEDURE (OUTPATIENT)
Dept: GENERAL RADIOLOGY | Facility: OTHER | Age: 46
End: 2021-08-13
Attending: FAMILY MEDICINE
Payer: COMMERCIAL

## 2021-08-13 VITALS
HEIGHT: 67 IN | HEART RATE: 65 BPM | DIASTOLIC BLOOD PRESSURE: 70 MMHG | BODY MASS INDEX: 32.23 KG/M2 | OXYGEN SATURATION: 98 % | TEMPERATURE: 98.9 F | SYSTOLIC BLOOD PRESSURE: 130 MMHG

## 2021-08-13 DIAGNOSIS — M25.572 PAIN IN JOINT INVOLVING ANKLE AND FOOT, LEFT: ICD-10-CM

## 2021-08-13 DIAGNOSIS — M79.672 LEFT FOOT PAIN: ICD-10-CM

## 2021-08-13 DIAGNOSIS — M25.572 PAIN IN JOINT INVOLVING ANKLE AND FOOT, LEFT: Primary | ICD-10-CM

## 2021-08-13 PROCEDURE — 73630 X-RAY EXAM OF FOOT: CPT | Mod: TC | Performed by: RADIOLOGY

## 2021-08-13 PROCEDURE — 99213 OFFICE O/P EST LOW 20 MIN: CPT | Performed by: FAMILY MEDICINE

## 2021-08-13 PROCEDURE — 73610 X-RAY EXAM OF ANKLE: CPT | Mod: TC | Performed by: RADIOLOGY

## 2021-08-13 ASSESSMENT — PAIN SCALES - GENERAL: PAINLEVEL: NO PAIN (1)

## 2021-08-13 NOTE — NURSING NOTE
"Chief Complaint   Patient presents with     Musculoskeletal Problem       Initial /70 (BP Location: Right arm, Patient Position: Chair, Cuff Size: Adult Large)   Pulse 65   Temp 98.9  F (37.2  C) (Tympanic)   Ht 1.702 m (5' 7\")   SpO2 98%   BMI 32.23 kg/m   Estimated body mass index is 32.23 kg/m  as calculated from the following:    Height as of this encounter: 1.702 m (5' 7\").    Weight as of 11/30/18: 93.4 kg (205 lb 12.8 oz).  Medication Reconciliation: complete  Hue Judge LPN    "

## 2021-08-13 NOTE — PROGRESS NOTES
"    Assessment & Plan     1. Pain in joint involving ankle and foot, left  As symptoms persist, will refer to Orthopedics for evaluation.  Follow-up as directed.  - XR Ankle Left G/E 3 Views; Future  - Orthopedic  Referral; Future      Return if symptoms worsen or fail to improve.    Savanna Cunningham MD  [facility] - LORE Sinclair is a 46 year old who presents for the following health issues     HPI     Musculoskeletal problem/pain  Onset/Duration: 7/5/21  Description  Location: foot and leg - left  Joint Swelling: no  Redness: no  Pain: YES  Warmth: no  Intensity:  moderate  Progression of Symptoms:  constant and waxing and waning  Accompanying signs and symptoms:   Fevers: no  Numbness/tingling/weakness: no  History  Trauma to the area: YES  Recent illness:  no  Previous similar problem: no  Previous evaluation:  no  Precipitating or alleviating factors:  Aggravating factors include: standing, walking, climbing stairs, exercise and overuse  Therapies tried and outcome: ice      Review of Systems   Constitutional, HEENT, cardiovascular, pulmonary, gi and gu systems are negative, except as otherwise noted.      Objective    /70 (BP Location: Right arm, Patient Position: Chair, Cuff Size: Adult Large)   Pulse 65   Temp 98.9  F (37.2  C) (Tympanic)   Ht 1.702 m (5' 7\")   SpO2 98%   BMI 32.23 kg/m    Body mass index is 32.23 kg/m .  Physical Exam   GENERAL: healthy, alert and no distress  MS: no gross musculoskeletal defects noted, no edema  PSYCH: mentation appears normal, affect normal/bright    Xray - Reviewed and interpreted by me.  No acute fractures            "

## 2021-08-25 ENCOUNTER — OFFICE VISIT (OUTPATIENT)
Dept: ORTHOPEDICS | Facility: OTHER | Age: 46
End: 2021-08-25
Attending: FAMILY MEDICINE
Payer: COMMERCIAL

## 2021-08-25 VITALS
TEMPERATURE: 98.1 F | DIASTOLIC BLOOD PRESSURE: 80 MMHG | BODY MASS INDEX: 32.11 KG/M2 | OXYGEN SATURATION: 96 % | WEIGHT: 205 LBS | HEART RATE: 77 BPM | SYSTOLIC BLOOD PRESSURE: 118 MMHG

## 2021-08-25 DIAGNOSIS — M25.572 PAIN IN JOINT INVOLVING ANKLE AND FOOT, LEFT: ICD-10-CM

## 2021-08-25 PROCEDURE — 99202 OFFICE O/P NEW SF 15 MIN: CPT | Performed by: SPECIALIST

## 2021-08-25 ASSESSMENT — PAIN SCALES - GENERAL: PAINLEVEL: MILD PAIN (3)

## 2021-08-25 NOTE — PROGRESS NOTES
Visit Date: 08/25/2021    Ms. Dottie Rodriguez is a 46-year-old female seen today for evaluation of her left foot.  She had an injury about 6 weeks ago.  She was stepping into a boat and she misstepped and twisted her left foot.  She complains of some pain in her left foot.  She did not really notice any swelling or ecchymosis at the time. She did notice some discomfort in her foot that persisted.  It has gotten somewhat better since the original injury, but she still notices it at least in the morning, she gets up and has some discomfort in her foot.  It does not feel right.  There is some pain and difficulty with weightbearing.  She rates her current pain level is 3/10 in severity.  She complains of pain medially, laterally and dorsally in the foot.  She really has a hard time localizing the pain or describing the pain.  She said it just does not feel right.    PHYSICAL EXAMINATION:  The patient is awake, alert, oriented, no acute distress.  Overall, general mood and affect appearance are normal.  Weight 205 pounds, blood pressure 118/80, pulse 77, temperature 98.1.  On evaluation of the left foot, there is no swelling, discoloration, deformity, or effusions.  There is no increased warmth or redness.  She has full range of motion of her foot and ankle.  She has good strength against resistance with dorsiflexion, plantarflexion, inversion and eversion.  She has palpable pulses, normal sensation and good capillary refill.  She has minimal tenderness over the lateral metatarsals.  She has intact Achilles tendon.  She has x-rays that were taken last week that included x-rays of her foot and x-rays of her ankle dated 08/13/2021.  The x-rays were negative of both the foot and the ankle.    ASSESSMENT AND PLAN:  The patient may have had a midfoot sprain type injury.  I do not see any evidence of significant displacement or any fractures or suggestion of a Lisfranc injury.  She is improving with conservative management.  I  would continue conservative management for now with avoidance of aggravating activities, avoidance of high impact activities.  If she does not improve satisfactorily in the next several weeks, then I would probably consider getting an MRI of her foot, but it sounds like the foot pain is improving and we will probably continue to improve with conservative management.  She is a .  She is going back to school in a few weeks and was concerned about returning to her job.  I think by then she will probably be doing reasonably well that she can return to her job duties without any restrictions.    Sean Bunn MD        D: 2021   T: 2021   MT: [id number]    Name:     ALEX MERCEDES  MRN:      [id number]        Account:    [id number]   :      1975           Visit Date: 2021     Document: [id number]

## 2021-08-25 NOTE — NURSING NOTE
"Chief Complaint   Patient presents with     Consult For     Left Foot & Ankle Pain         Initial /80 (BP Location: Right arm, Patient Position: Sitting, Cuff Size: Adult Regular)   Pulse 77   Temp 98.1  F (36.7  C) (Tympanic)   Wt 93 kg (205 lb)   SpO2 96%   BMI 32.11 kg/m   Estimated body mass index is 32.11 kg/m  as calculated from the following:    Height as of 8/13/21: 1.702 m (5' 7\").    Weight as of this encounter: 93 kg (205 lb).  Medication Reconciliation: complete  Halina Deluna LPN  "

## 2021-10-03 ENCOUNTER — HEALTH MAINTENANCE LETTER (OUTPATIENT)
Age: 46
End: 2021-10-03

## 2022-01-23 ENCOUNTER — HEALTH MAINTENANCE LETTER (OUTPATIENT)
Age: 47
End: 2022-01-23

## 2022-07-09 ENCOUNTER — HEALTH MAINTENANCE LETTER (OUTPATIENT)
Age: 47
End: 2022-07-09

## 2022-09-04 ENCOUNTER — HEALTH MAINTENANCE LETTER (OUTPATIENT)
Age: 47
End: 2022-09-04

## 2023-02-07 ENCOUNTER — LAB (OUTPATIENT)
Dept: LAB | Facility: OTHER | Age: 48
End: 2023-02-07
Payer: COMMERCIAL

## 2023-02-07 ENCOUNTER — E-VISIT (OUTPATIENT)
Dept: FAMILY MEDICINE | Facility: OTHER | Age: 48
End: 2023-02-07
Attending: FAMILY MEDICINE
Payer: COMMERCIAL

## 2023-02-07 DIAGNOSIS — R30.0 DIFFICULT OR PAINFUL URINATION: Primary | ICD-10-CM

## 2023-02-07 DIAGNOSIS — R30.0 DIFFICULT OR PAINFUL URINATION: ICD-10-CM

## 2023-02-07 LAB
ALBUMIN UR-MCNC: NEGATIVE MG/DL
APPEARANCE UR: ABNORMAL
BACTERIA #/AREA URNS HPF: ABNORMAL /HPF
BILIRUB UR QL STRIP: NEGATIVE
COLOR UR AUTO: YELLOW
GLUCOSE UR STRIP-MCNC: NEGATIVE MG/DL
HGB UR QL STRIP: NEGATIVE
KETONES UR STRIP-MCNC: NEGATIVE MG/DL
LEUKOCYTE ESTERASE UR QL STRIP: ABNORMAL
NITRATE UR QL: NEGATIVE
PH UR STRIP: 7 [PH] (ref 5–7)
RBC #/AREA URNS AUTO: ABNORMAL /HPF
SP GR UR STRIP: 1.02 (ref 1–1.03)
SQUAMOUS #/AREA URNS AUTO: ABNORMAL /LPF
UROBILINOGEN UR STRIP-ACNC: 0.2 E.U./DL
WBC #/AREA URNS AUTO: ABNORMAL /HPF

## 2023-02-07 PROCEDURE — 87186 SC STD MICRODIL/AGAR DIL: CPT

## 2023-02-07 PROCEDURE — 87088 URINE BACTERIA CULTURE: CPT

## 2023-02-07 PROCEDURE — 81001 URINALYSIS AUTO W/SCOPE: CPT

## 2023-02-07 PROCEDURE — 99421 OL DIG E/M SVC 5-10 MIN: CPT | Performed by: FAMILY MEDICINE

## 2023-02-07 PROCEDURE — 87086 URINE CULTURE/COLONY COUNT: CPT

## 2023-02-07 NOTE — TELEPHONE ENCOUNTER
Provider E-Visit time total (minutes): 5    ELECTRONIC VISIT DOCUMENTATION:    SUBJECTIVE:  Note above accurately captures the substance of my conversation with the patient.    ASSESSMENT/ PLAN:  1. Difficult or painful urination  UA ordered, will treat as indicated.  - UA reflex to Microscopic and Culture; Future    Savanna Cunningham MD

## 2023-02-07 NOTE — PATIENT INSTRUCTIONS
Dear Dottie Rodriguez,     After reviewing your responses, I would like you to come in for a urine test to make sure we treat you correctly. Our  will contact you for a lab only appointment today     You will receive instructions with your results in [facility] once they are available.     If your symptoms worsen, you develop pain in your back or stomach, develop fevers, or are not improving in 5 days, please contact your primary care provider for an appointment or visit a Walk-in or Urgent Care Center to be seen.     Thanks again for choosing us as your health care partner,     Savanna Cunningham MD

## 2023-02-08 DIAGNOSIS — N39.0 ACUTE UTI: Primary | ICD-10-CM

## 2023-02-08 RX ORDER — NITROFURANTOIN 25; 75 MG/1; MG/1
100 CAPSULE ORAL 2 TIMES DAILY
Qty: 14 CAPSULE | Refills: 0 | Status: SHIPPED | OUTPATIENT
Start: 2023-02-08 | End: 2023-02-10

## 2023-02-10 DIAGNOSIS — N39.0 ACUTE UTI: Primary | ICD-10-CM

## 2023-02-10 LAB — BACTERIA UR CULT: ABNORMAL

## 2023-02-10 RX ORDER — AMOXICILLIN 875 MG
875 TABLET ORAL 2 TIMES DAILY
Qty: 14 TABLET | Refills: 0 | Status: SHIPPED | OUTPATIENT
Start: 2023-02-10 | End: 2023-02-17

## 2023-04-29 ENCOUNTER — HEALTH MAINTENANCE LETTER (OUTPATIENT)
Age: 48
End: 2023-04-29

## 2023-07-23 ENCOUNTER — HEALTH MAINTENANCE LETTER (OUTPATIENT)
Age: 48
End: 2023-07-23

## 2023-08-23 ENCOUNTER — OFFICE VISIT (OUTPATIENT)
Dept: FAMILY MEDICINE | Facility: OTHER | Age: 48
End: 2023-08-23
Attending: NURSE PRACTITIONER
Payer: COMMERCIAL

## 2023-08-23 ENCOUNTER — NURSE TRIAGE (OUTPATIENT)
Dept: FAMILY MEDICINE | Facility: OTHER | Age: 48
End: 2023-08-23

## 2023-08-23 VITALS
TEMPERATURE: 97.3 F | OXYGEN SATURATION: 95 % | BODY MASS INDEX: 34.28 KG/M2 | WEIGHT: 218.9 LBS | SYSTOLIC BLOOD PRESSURE: 118 MMHG | DIASTOLIC BLOOD PRESSURE: 78 MMHG | HEART RATE: 69 BPM

## 2023-08-23 DIAGNOSIS — Z12.31 ENCOUNTER FOR SCREENING MAMMOGRAM FOR MALIGNANT NEOPLASM OF BREAST: Primary | ICD-10-CM

## 2023-08-23 DIAGNOSIS — R07.81 RIB PAIN: ICD-10-CM

## 2023-08-23 PROCEDURE — 99213 OFFICE O/P EST LOW 20 MIN: CPT | Performed by: NURSE PRACTITIONER

## 2023-08-23 ASSESSMENT — ANXIETY QUESTIONNAIRES
2. NOT BEING ABLE TO STOP OR CONTROL WORRYING: NOT AT ALL
6. BECOMING EASILY ANNOYED OR IRRITABLE: NOT AT ALL
3. WORRYING TOO MUCH ABOUT DIFFERENT THINGS: NOT AT ALL
1. FEELING NERVOUS, ANXIOUS, OR ON EDGE: NOT AT ALL
GAD7 TOTAL SCORE: 0
GAD7 TOTAL SCORE: 0
7. FEELING AFRAID AS IF SOMETHING AWFUL MIGHT HAPPEN: NOT AT ALL
5. BEING SO RESTLESS THAT IT IS HARD TO SIT STILL: NOT AT ALL
IF YOU CHECKED OFF ANY PROBLEMS ON THIS QUESTIONNAIRE, HOW DIFFICULT HAVE THESE PROBLEMS MADE IT FOR YOU TO DO YOUR WORK, TAKE CARE OF THINGS AT HOME, OR GET ALONG WITH OTHER PEOPLE: NOT DIFFICULT AT ALL

## 2023-08-23 ASSESSMENT — PATIENT HEALTH QUESTIONNAIRE - PHQ9
5. POOR APPETITE OR OVEREATING: NOT AT ALL
SUM OF ALL RESPONSES TO PHQ QUESTIONS 1-9: 0

## 2023-08-23 ASSESSMENT — PAIN SCALES - GENERAL: PAINLEVEL: SEVERE PAIN (7)

## 2023-08-23 NOTE — PROGRESS NOTES
Assessment & Plan         Rib pain  - Voltaren gel OTC  - Ibuprofen as needed  - Ice  - Follow-up if unimproved      Encounter for screening mammogram for malignant neoplasm of breast  - MA Screen Bilateral w/González; Future        Fanta Kramer CNP  [facility] - LORE Sinclair is a 48 year old, presenting for the following health issues:  Abdominal Pain      Concern - pain  Onset: 08/21/2023  Description: pain in right side rib area when taking deep breath  Intensity: 7/10 when deep breathing  Progression of Symptoms:  same  Accompanying Signs & Symptoms: none  Previous history of similar problem: none  Precipitating factors:        Worsened by: nothing  Alleviating factors:        Improved by: nothing  Therapies tried and outcome: None        Patient Active Problem List   Diagnosis    ACP (advance care planning)     Past Surgical History:   Procedure Laterality Date    COLONOSCOPY  8/18/16    ENT SURGERY      T & A    GYN SURGERY      c section x 2    LAPAROSCOPIC CHOLECYSTECTOMY N/A 8/6/2018    Procedure: LAPAROSCOPIC CHOLECYSTECTOMY;  LAPAROSCOPIC CHOLECYSTECTOMY;  Surgeon: Tyshawn Ford MD;  Location: [facility]       Social History     Tobacco Use    Smoking status: Never    Smokeless tobacco: Never   Substance Use Topics    Alcohol use: Yes     Alcohol/week: 0.0 standard drinks of alcohol     Comment: social     Family History   Problem Relation Age of Onset    Hypertension Mother     Rheumatoid Arthritis Mother     Hyperlipidemia Father            Current Outpatient Medications   Medication Sig Dispense Refill    HERBALS Take 2 each by mouth daily Protandim NRF-1 2 capsules by mouth daily      HERBALS Take 1 each by mouth Protandim NRF-2 1 tablet by mouth daily      Probiotic Product (PROBIOTIC DAILY PO)       EPINEPHrine (EPIPEN/ADRENACLICK/OR ANY BX GENERIC EQUIV) 0.3 MG/0.3ML injection 2-pack Inject 0.3 mLs (0.3 mg) into the muscle as needed for anaphylaxis (Patient not taking: Reported  on 8/25/2021) 0.6 mL 1     Allergies   Allergen Reactions    Apples [Apple Juice] Shortness Of Breath    Nuts Shortness Of Breath    Pineapple Itching    Ciprofloxacin Nausea and Vomiting    Carrots [Daucus Carota] Itching    Cherry Itching    Peaches [Prunus Persica] Itching    Spinach      Can eat the organic        Recent Labs   Lab Test 06/13/16  1710   ALT 26   CR 0.82   GFRESTIMATED 76   GFRESTBLACK >90   GFR Calc     POTASSIUM 3.7   TSH 1.28        BP Readings from Last 3 Encounters:   08/23/23 118/78   08/25/21 118/80   08/13/21 130/70    Wt Readings from Last 3 Encounters:   08/23/23 99.3 kg (218 lb 14.4 oz)   08/25/21 93 kg (205 lb)   11/30/18 93.4 kg (205 lb 12.8 oz)                   Review of Systems   Constitutional, HEENT, cardiovascular, pulmonary, gi and gu systems are negative, except as otherwise noted.          Objective    /78 (BP Location: Left arm, Patient Position: Sitting, Cuff Size: Adult Large)   Pulse 69   Temp 97.3  F (36.3  C) (Tympanic)   Wt 99.3 kg (218 lb 14.4 oz)   SpO2 95%   BMI 34.28 kg/m    Body mass index is 34.28 kg/m .        Physical Exam   GENERAL: healthy, alert and no distress  EYES: Eyes grossly normal to inspection, PERRL and conjunctivae and sclerae normal  HENT: ear canals and TM's normal, nose and mouth without ulcers or lesions  NECK: no adenopathy, no asymmetry, masses, or scars and thyroid normal to palpation  RESP: lungs clear to auscultation - no rales, rhonchi or wheezes  CV: regular rate and rhythm, normal S1 S2, no S3 or S4, no murmur, click or rub, no peripheral edema and peripheral pulses strong  ABDOMEN: soft, nontender, no hepatosplenomegaly, no masses and bowel sounds normal  MS: Right low rib tenderness on palpation - mid clavicular line - no discernable swelling  PSYCH: mentation appears normal, affect normal/bright

## 2023-08-23 NOTE — PATIENT INSTRUCTIONS
Assessment & Plan         Rib pain  - Voltaren gel OTC  - Ibuprofen as needed  - Ice  - Follow-up if unimproved        Encounter for screening mammogram for malignant neoplasm of breast  - MA Screen Bilateral w/González; Future        Fanta Kramer CNP  [facility] [place] [facility]

## 2023-08-23 NOTE — TELEPHONE ENCOUNTER
"Reason for Disposition   Patient wants to be seen    Answer Assessment - Initial Assessment Questions  1. LOCATION: \"Where does it hurt?\"       Right upper under rib cage  2. RADIATION: \"Does the pain shoot anywhere else?\" (e.g., chest, back)      no  3. ONSET: \"When did the pain begin?\" (e.g., minutes, hours or days ago)       Monday  4. SUDDEN: \"Gradual or sudden onset?\"      sudden  5. PATTERN \"Does the pain come and go, or is it constant?\"     - If constant: \"Is it getting better, staying the same, or worsening?\"       (Note: Constant means the pain never goes away completely; most serious pain is constant and it progresses)      - If intermittent: \"How long does it last?\" \"Do you have pain now?\"      (Note: Intermittent means the pain goes away completely between bouts)      When patient takes deep breaths she has sharp pain  Does not hurt to touch  Hurts when rolls in bed  Seemed to get worse with activity  6. SEVERITY: \"How bad is the pain?\"  (e.g., Scale 1-10; mild, moderate, or severe)     - MILD (1-3): doesn't interfere with normal activities, abdomen soft and not tender to touch      - MODERATE (4-7): interferes with normal activities or awakens from sleep, abdomen tender to touch      - SEVERE (8-10): excruciating pain, doubled over, unable to do any normal activities        mild  7. RECURRENT SYMPTOM: \"Have you ever had this type of stomach pain before?\" If Yes, ask: \"When was the last time?\" and \"What happened that time?\"       no  8. AGGRAVATING FACTORS: \"Does anything seem to cause this pain?\" (e.g., foods, stress, alcohol)      no  9. CARDIAC SYMPTOMS: \"Do you have any of the following symptoms: chest pain, difficulty breathing, sweating, nausea?\"      no  10. OTHER SYMPTOMS: \"Do you have any other symptoms?\" (e.g., back pain, diarrhea, fever, urination pain, vomiting)        no  11. PREGNANCY: \"Is there any chance you are pregnant?\" \"When was your last menstrual period?\"        no    Protocols " used: Abdominal Pain - [place]

## 2023-09-26 ENCOUNTER — E-VISIT (OUTPATIENT)
Dept: FAMILY MEDICINE | Facility: OTHER | Age: 48
End: 2023-09-26
Payer: COMMERCIAL

## 2023-09-26 DIAGNOSIS — J01.90 ACUTE NON-RECURRENT SINUSITIS, UNSPECIFIED LOCATION: Primary | ICD-10-CM

## 2023-09-26 PROCEDURE — 99421 OL DIG E/M SVC 5-10 MIN: CPT | Performed by: FAMILY MEDICINE

## 2023-09-26 NOTE — PATIENT INSTRUCTIONS
Dear Dottie Rodriguez    After reviewing your responses, I've been able to diagnose you with Acute non-recurrent sinusitis, unspecified location.      Based on your responses and diagnosis, I have prescribed Augmentin.  We usually don't treat with antibiotics unless your symptoms have been present for over 10 days, but we will err on the side of caution with your upcoming procedure.  I have sent this to your pharmacy.?     It is also important to stay well hydrated, get lots of rest and take over-the-counter decongestants,?tylenol?or ibuprofen if you?are able to?take those medications per your primary care provider to help relieve discomfort.?     It is important that you take?all of?your prescribed medication even if your symptoms are improving after a few doses.? Taking?all of?your medicine helps prevent the symptoms from returning.?     If your symptoms worsen, you develop severe headache, vomiting, high fever (>102), or are not improving in 7 days, please contact your primary care provider for an appointment or visit any of our convenient Walk-in Care or Urgent Care Centers to be seen which can be found on our website?here.?     Thanks again for choosing?us?as your health care partner,?   ?  Savanna Cunningham MD?

## 2023-09-26 NOTE — TELEPHONE ENCOUNTER
Provider E-Visit time total (minutes): 4    ELECTRONIC VISIT DOCUMENTATION:    SUBJECTIVE:  Note above accurately captures the substance of my conversation with the patient.    ASSESSMENT/ PLAN:  1. Acute non-recurrent sinusitis, unspecified location  With upcoming procedure, will err on the side of caution and will treat with antibiotics.  Follow-up if no improvement noted.  - amoxicillin-clavulanate (AUGMENTIN) 875-125 MG tablet; Take 1 tablet by mouth 2 times daily for 7 days  Dispense: 14 tablet; Refill: 0    Savanna Cunningham MD

## 2023-10-03 ENCOUNTER — LAB REQUISITION (OUTPATIENT)
Dept: LAB | Facility: CLINIC | Age: 48
End: 2023-10-03
Payer: COMMERCIAL

## 2023-10-03 DIAGNOSIS — L72.11 PILAR CYST: ICD-10-CM

## 2023-10-03 PROCEDURE — 88305 TISSUE EXAM BY PATHOLOGIST: CPT | Mod: 26 | Performed by: DERMATOLOGY

## 2023-10-03 PROCEDURE — 88305 TISSUE EXAM BY PATHOLOGIST: CPT | Mod: TC,ORL | Performed by: DERMATOLOGY

## 2023-10-11 LAB
PATH REPORT.COMMENTS IMP SPEC: NORMAL
PATH REPORT.FINAL DX SPEC: NORMAL
PATH REPORT.GROSS SPEC: NORMAL
PATH REPORT.MICROSCOPIC SPEC OTHER STN: NORMAL
PATH REPORT.RELEVANT HX SPEC: NORMAL

## 2023-10-23 ENCOUNTER — OFFICE VISIT (OUTPATIENT)
Dept: FAMILY MEDICINE | Facility: OTHER | Age: 48
End: 2023-10-23
Attending: FAMILY MEDICINE
Payer: COMMERCIAL

## 2023-10-23 VITALS
WEIGHT: 218.2 LBS | RESPIRATION RATE: 18 BRPM | HEIGHT: 67 IN | HEART RATE: 70 BPM | SYSTOLIC BLOOD PRESSURE: 130 MMHG | OXYGEN SATURATION: 98 % | TEMPERATURE: 97.5 F | DIASTOLIC BLOOD PRESSURE: 82 MMHG | BODY MASS INDEX: 34.25 KG/M2

## 2023-10-23 DIAGNOSIS — Z00.00 ROUTINE GENERAL MEDICAL EXAMINATION AT A HEALTH CARE FACILITY: Primary | ICD-10-CM

## 2023-10-23 DIAGNOSIS — Z23 NEED FOR VACCINATION: ICD-10-CM

## 2023-10-23 DIAGNOSIS — Z23 NEED FOR PROPHYLACTIC VACCINATION AND INOCULATION AGAINST INFLUENZA: ICD-10-CM

## 2023-10-23 DIAGNOSIS — Z12.4 CERVICAL CANCER SCREENING: ICD-10-CM

## 2023-10-23 DIAGNOSIS — Z12.11 SCREEN FOR COLON CANCER: ICD-10-CM

## 2023-10-23 LAB
CHOLEST SERPL-MCNC: 193 MG/DL
FASTING STATUS PATIENT QL REPORTED: NO
GLUCOSE SERPL-MCNC: 98 MG/DL (ref 70–99)
HDLC SERPL-MCNC: 41 MG/DL
HOLD SPECIMEN: NORMAL
LDLC SERPL CALC-MCNC: 104 MG/DL
NONHDLC SERPL-MCNC: 152 MG/DL
TRIGL SERPL-MCNC: 240 MG/DL

## 2023-10-23 PROCEDURE — 87624 HPV HI-RISK TYP POOLED RSLT: CPT | Performed by: FAMILY MEDICINE

## 2023-10-23 PROCEDURE — G0123 SCREEN CERV/VAG THIN LAYER: HCPCS | Performed by: FAMILY MEDICINE

## 2023-10-23 PROCEDURE — 82947 ASSAY GLUCOSE BLOOD QUANT: CPT | Performed by: FAMILY MEDICINE

## 2023-10-23 PROCEDURE — 80061 LIPID PANEL: CPT | Performed by: FAMILY MEDICINE

## 2023-10-23 PROCEDURE — 99396 PREV VISIT EST AGE 40-64: CPT | Mod: 25 | Performed by: FAMILY MEDICINE

## 2023-10-23 PROCEDURE — 36415 COLL VENOUS BLD VENIPUNCTURE: CPT | Performed by: FAMILY MEDICINE

## 2023-10-23 PROCEDURE — 90715 TDAP VACCINE 7 YRS/> IM: CPT | Performed by: FAMILY MEDICINE

## 2023-10-23 PROCEDURE — 90471 IMMUNIZATION ADMIN: CPT | Performed by: FAMILY MEDICINE

## 2023-10-23 ASSESSMENT — ENCOUNTER SYMPTOMS
FREQUENCY: 0
ARTHRALGIAS: 1
ABDOMINAL PAIN: 0
DIARRHEA: 0
HEMATURIA: 0
JOINT SWELLING: 0
BREAST MASS: 0
PALPITATIONS: 0
DYSURIA: 0
SHORTNESS OF BREATH: 0
SORE THROAT: 0
EYE PAIN: 0
COUGH: 0
PARESTHESIAS: 0
MYALGIAS: 0
NAUSEA: 0
DIZZINESS: 0
HEMATOCHEZIA: 0
HEADACHES: 0
WEAKNESS: 0
CONSTIPATION: 0
FEVER: 0
CHILLS: 0
HEARTBURN: 0
NERVOUS/ANXIOUS: 0

## 2023-10-23 ASSESSMENT — PAIN SCALES - GENERAL: PAINLEVEL: NO PAIN (0)

## 2023-10-23 NOTE — PROGRESS NOTES
SUBJECTIVE:   CC: Dottie is an 48 year old who presents for preventive health visit.       Healthy Habits:     Getting at least 3 servings of Calcium per day:  Yes    Bi-annual eye exam:  Yes    Dental care twice a year:  Yes    Sleep apnea or symptoms of sleep apnea:  None    Diet:  Regular (no restrictions)    Frequency of exercise:  None    Taking medications regularly:  Not Applicable    Medication side effects:  Not applicable    Additional concerns today:  Yes      Have you ever done Advance Care Planning? (For example, a Health Directive, POLST, or a discussion with a medical provider or your loved ones about your wishes): No, advance care planning information given to patient to review.  Patient plans to discuss their wishes with loved ones or provider.      Social History     Tobacco Use    Smoking status: Never    Smokeless tobacco: Never   Substance Use Topics    Alcohol use: Yes     Alcohol/week: 0.0 standard drinks of alcohol     Comment: social             10/23/2023     8:56 AM   Alcohol Use   Prescreen: >3 drinks/day or >7 drinks/week? No     Reviewed orders with patient.  Reviewed health maintenance and updated orders accordingly - Yes  Patient Active Problem List   Diagnosis    ACP (advance care planning)     Past Surgical History:   Procedure Laterality Date    COLONOSCOPY  8/18/16    ENT SURGERY      T & A    GYN SURGERY      c section x 2    LAPAROSCOPIC CHOLECYSTECTOMY N/A 8/6/2018    Procedure: LAPAROSCOPIC CHOLECYSTECTOMY;  LAPAROSCOPIC CHOLECYSTECTOMY;  Surgeon: Tyshawn Ford MD;  Location: [facility]       Social History     Tobacco Use    Smoking status: Never    Smokeless tobacco: Never   Substance Use Topics    Alcohol use: Yes     Alcohol/week: 0.0 standard drinks of alcohol     Comment: social     Family History   Problem Relation Age of Onset    Hypertension Mother     Rheumatoid Arthritis Mother     Hyperlipidemia Father          Current Outpatient Medications   Medication Sig  Dispense Refill    HERBALS Take 2 each by mouth daily Protandim NRF-1 2 capsules by mouth daily      HERBALS Take 1 each by mouth Protandim NRF-2 1 tablet by mouth daily      Probiotic Product (PROBIOTIC DAILY PO)       EPINEPHrine (EPIPEN/ADRENACLICK/OR ANY BX GENERIC EQUIV) 0.3 MG/0.3ML injection 2-pack Inject 0.3 mLs (0.3 mg) into the muscle as needed for anaphylaxis (Patient not taking: Reported on 8/25/2021) 0.6 mL 1     Allergies   Allergen Reactions    Apples [Apple Juice] Shortness Of Breath    Nuts Shortness Of Breath    Pineapple Itching    Ciprofloxacin Nausea and Vomiting    Carrots [Daucus Carota] Itching    Cherry Itching    Peaches [Prunus Persica] Itching    Spinach      Can eat the organic        Breast Cancer Screening:    FHS-7:       10/23/2023     8:57 AM   Breast CA Risk Assessment (FHS-7)   Did any of your first-degree relatives have breast or ovarian cancer? No   Did any of your relatives have bilateral breast cancer? No   Did any man in your family have breast cancer? No   Did any woman in your family have breast and ovarian cancer? No   Did any woman in your family have breast cancer before age 50 y? No   Do you have 2 or more relatives with breast and/or ovarian cancer? No   Do you have 2 or more relatives with breast and/or bowel cancer? No       Mammogram Screening: Recommended annual mammography  Pertinent mammograms are reviewed under the imaging tab.    History of abnormal Pap smear: NO - age 30-65 PAP every 5 years with negative HPV co-testing recommended      Latest Ref Rng & Units 10/23/2023     9:22 AM   PAP / HPV   PAP  Negative for Intraepithelial Lesion or Malignancy (NILM)    HPV 16 DNA Negative Negative    HPV 18 DNA Negative Negative    Other HR HPV Negative Negative      Reviewed and updated as needed this visit by clinical staff   Tobacco  Allergies  Meds  Problems  Med Hx  Surg Hx  Fam Hx          Reviewed and updated as needed this visit by Provider   Tobacco   "Allergies  Meds  Problems  Med Hx  Surg Hx  Fam Hx             Review of Systems   Constitutional:  Negative for chills and fever.   HENT:  Negative for congestion, ear pain, hearing loss and sore throat.    Eyes:  Negative for pain and visual disturbance.   Respiratory:  Negative for cough and shortness of breath.    Cardiovascular:  Negative for chest pain, palpitations and peripheral edema.   Gastrointestinal:  Negative for abdominal pain, constipation, diarrhea, heartburn, hematochezia and nausea.   Breasts:  Negative for tenderness, breast mass and discharge.   Genitourinary:  Negative for dysuria, frequency, genital sores, hematuria, pelvic pain, urgency, vaginal bleeding and vaginal discharge.   Musculoskeletal:  Positive for arthralgias. Negative for joint swelling and myalgias.   Skin:  Negative for rash.   Neurological:  Negative for dizziness, weakness, headaches and paresthesias.   Psychiatric/Behavioral:  Negative for mood changes. The patient is not nervous/anxious.         OBJECTIVE:   /82 (BP Location: Right arm, Patient Position: Sitting, Cuff Size: Adult Regular)   Pulse 70   Temp 97.5  F (36.4  C) (Tympanic)   Resp 18   Ht 1.702 m (5' 7\")   Wt 99 kg (218 lb 3.2 oz)   SpO2 98%   BMI 34.17 kg/m    Physical Exam  GENERAL: healthy, alert and no distress  EYES: Eyes grossly normal to inspection, PERRL and conjunctivae and sclerae normal  HENT: ear canals and TM's normal, nose and mouth without ulcers or lesions  NECK: no adenopathy  RESP: lungs clear to auscultation - no rales, rhonchi or wheezes  CV: regular rates and rhythm, normal S1 S2, no S3 or S4, and no murmur, click or rub  ABDOMEN: soft, nontender, no hepatosplenomegaly, no masses and bowel sounds normal   (female): normal female external genitalia, normal urethral meatus, vaginal mucosa pink, moist, well rugated, and normal cervix/adnexa/uterus without masses or discharge  MS: no gross musculoskeletal defects noted, no " "edema  SKIN: no suspicious lesions or rashes  NEURO: Normal strength and tone, mentation intact and speech normal  PSYCH: mentation appears normal, affect normal/bright    Diagnostic Test Results:  See orders    ASSESSMENT/PLAN:       ICD-10-CM    1. Routine general medical examination at a health care facility  Z00.00 Lipid Profile (Chol, Trig, HDL, LDL calc)     Glucose     Lipid Profile (Chol, Trig, HDL, LDL calc)     Glucose      2. Cervical cancer screening  Z12.4 Pap Screen with HPV - recommended age 30 - 65 years     HPV Hold (Lab Only)     HPV High Risk Types DNA Cervical      3. Screen for colon cancer  Z12.11 Colonoscopy Screening  Referral      4. Need for vaccination  Z23 TDAP 10-64Y (ADACEL,BOOSTRIX)      5. Need for prophylactic vaccination and inoculation against influenza  Z23             COUNSELING:  Reviewed preventive health counseling, as reflected in patient instructions       Immunizations  Vaccinated for: TDAP         Colorectal Cancer Screening      BMI:   Estimated body mass index is 34.17 kg/m  as calculated from the following:    Height as of this encounter: 1.702 m (5' 7\").    Weight as of this encounter: 99 kg (218 lb 3.2 oz).       She reports that she has never smoked. She has never used smokeless tobacco.            Savanna Cunningham MD  [facility]"

## 2023-10-24 ENCOUNTER — TELEPHONE (OUTPATIENT)
Dept: FAMILY MEDICINE | Facility: OTHER | Age: 48
End: 2023-10-24

## 2023-10-25 ENCOUNTER — TELEPHONE (OUTPATIENT)
Dept: FAMILY MEDICINE | Facility: OTHER | Age: 48
End: 2023-10-25

## 2023-10-25 DIAGNOSIS — Z12.11 ENCOUNTER FOR SCREENING COLONOSCOPY: Primary | ICD-10-CM

## 2023-10-25 LAB
BKR LAB AP GYN ADEQUACY: NORMAL
BKR LAB AP GYN INTERPRETATION: NORMAL
BKR LAB AP HPV REFLEX: NORMAL
BKR LAB AP PREVIOUS ABNORMAL: NORMAL
PATH REPORT.COMMENTS IMP SPEC: NORMAL
PATH REPORT.COMMENTS IMP SPEC: NORMAL
PATH REPORT.RELEVANT HX SPEC: NORMAL

## 2023-10-25 RX ORDER — BISACODYL 5 MG/1
10 TABLET, DELAYED RELEASE ORAL ONCE
Qty: 2 TABLET | Refills: 0 | Status: SHIPPED | OUTPATIENT
Start: 2023-10-25 | End: 2023-10-25

## 2023-10-25 NOTE — TELEPHONE ENCOUNTER
Screening Questions for the Scheduling of Screening Colonoscopies     (If Colonoscopy is diagnostic, Provider should review the chart before scheduling.)    Are you younger than 50 or older than 80?  Yes, 48 year old    Do you take aspirin or fish oil?  No (if yes, tell patient to stop 1 week prior to Colonoscopy)    Do you take warfarin (Coumadin), clopidogrel (Plavix), apixaban (Eliquis), dabigatram (Pradaxa), rivaroxaban (Xarelto) or any blood thinner? No    Do you use oxygen at home?  No    Do you have kidney disease? No    Are you on dialysis? No    Have you had a stroke or heart attack in the last year? No    Have you had a stent in your heart or any blood vessel in the last year? No    Have you had a transplant of any organ?  No    Have you had a colonoscopy or upper endoscopy (EGD) before?  No         Date of scheduled Colonoscopy: 1/24/24     Provider: Dr. Donahue     [facility]

## 2023-10-26 LAB
HUMAN PAPILLOMA VIRUS 16 DNA: NEGATIVE
HUMAN PAPILLOMA VIRUS 18 DNA: NEGATIVE
HUMAN PAPILLOMA VIRUS FINAL DIAGNOSIS: NORMAL
HUMAN PAPILLOMA VIRUS OTHER HR: NEGATIVE

## 2023-11-30 ENCOUNTER — ANCILLARY PROCEDURE (OUTPATIENT)
Dept: MAMMOGRAPHY | Facility: OTHER | Age: 48
End: 2023-11-30
Attending: NURSE PRACTITIONER
Payer: COMMERCIAL

## 2023-11-30 ENCOUNTER — TELEPHONE (OUTPATIENT)
Dept: MAMMOGRAPHY | Facility: OTHER | Age: 48
End: 2023-11-30

## 2023-11-30 DIAGNOSIS — Z12.31 ENCOUNTER FOR SCREENING MAMMOGRAM FOR MALIGNANT NEOPLASM OF BREAST: ICD-10-CM

## 2023-11-30 PROCEDURE — 77063 BREAST TOMOSYNTHESIS BI: CPT | Mod: TC | Performed by: RADIOLOGY

## 2023-11-30 PROCEDURE — 77067 SCR MAMMO BI INCL CAD: CPT | Mod: TC | Performed by: RADIOLOGY

## 2024-01-23 ENCOUNTER — ANESTHESIA EVENT (OUTPATIENT)
Dept: SURGERY | Facility: HOSPITAL | Age: 49
End: 2024-01-23
Payer: COMMERCIAL

## 2024-01-23 RX ORDER — ONDANSETRON 4 MG/1
4 TABLET, ORALLY DISINTEGRATING ORAL EVERY 30 MIN PRN
Status: CANCELLED | OUTPATIENT
Start: 2024-01-23

## 2024-01-23 RX ORDER — ONDANSETRON 2 MG/ML
4 INJECTION INTRAMUSCULAR; INTRAVENOUS EVERY 30 MIN PRN
Status: CANCELLED | OUTPATIENT
Start: 2024-01-23

## 2024-01-23 NOTE — ANESTHESIA PREPROCEDURE EVALUATION
Anesthesia Pre-Procedure Evaluation    Patient: Dottie Rodriguez   MRN: [id number] : 1975        Procedure : Procedure(s):  COLONOSCOPY          Past Medical History:   Diagnosis Date     Ovarian cyst 2017    rupturing ovarian cysts      Past Surgical History:   Procedure Laterality Date     COLONOSCOPY  2016     ENT SURGERY      T & A     GYN SURGERY      c section x 2     LAPAROSCOPIC CHOLECYSTECTOMY N/A 2018    Procedure: LAPAROSCOPIC CHOLECYSTECTOMY;  LAPAROSCOPIC CHOLECYSTECTOMY;  Surgeon: Tyshawn Ford MD;  Location: [facility]      Allergies   Allergen Reactions     Apples [Apple Juice] Shortness Of Breath     Nuts Shortness Of Breath     Pineapple Itching     Ciprofloxacin Nausea and Vomiting     Carrots [Daucus Carota] Itching     Cherry Itching     Peaches [Prunus Persica] Itching     Spinach      Can eat the organic       Social History     Tobacco Use     Smoking status: Never     Smokeless tobacco: Never   Substance Use Topics     Alcohol use: Yes     Alcohol/week: 0.0 standard drinks of alcohol     Comment: social      Wt Readings from Last 1 Encounters:   10/23/23 99 kg (218 lb 3.2 oz)        Anesthesia Evaluation   Pt has had prior anesthetic. Type: MAC and General.    No history of anesthetic complications       ROS/MED HX  ENT/Pulmonary:  - neg pulmonary ROS     Neurologic:  - neg neurologic ROS     Cardiovascular:  - neg cardiovascular ROS     METS/Exercise Tolerance:     Hematologic:  - neg hematologic  ROS     Musculoskeletal: Comment: Arthralgias  - neg musculoskeletal ROS     GI/Hepatic:  - neg GI/hepatic ROS   (+)        bowel prep,            Renal/Genitourinary:  - neg Renal ROS     Endo:     (+)               Obesity,       Psychiatric/Substance Use:  - neg psychiatric ROS     Infectious Disease:  - neg infectious disease ROS     Malignancy:  - neg malignancy ROS     Other:  - neg other ROS          Physical Exam    Airway  airway exam normal      Mallampati: II  "  TM distance: > 3 FB   Neck ROM: full   Mouth opening: > 3 cm    Respiratory Devices and Support         Dental       (+) Completely normal teeth      Cardiovascular   cardiovascular exam normal       Rhythm and rate: regular and normal     Pulmonary   pulmonary exam normal        breath sounds clear to auscultation       OUTSIDE LABS:  CBC:   Lab Results   Component Value Date    WBC 12.3 (H) 06/13/2016    HGB 13.9 06/13/2016    HCT 40.0 06/13/2016     06/13/2016     BMP:   Lab Results   Component Value Date     06/13/2016    POTASSIUM 3.7 06/13/2016    CHLORIDE 105 06/13/2016    CO2 27 06/13/2016    BUN 19 06/13/2016    CR 0.82 06/13/2016    GLC 98 10/23/2023    GLC 82 06/13/2016     COAGS: No results found for: \"PTT\", \"INR\", \"FIBR\"  POC:   Lab Results   Component Value Date    HCG Negative 08/06/2018     HEPATIC:   Lab Results   Component Value Date    ALBUMIN 3.8 06/13/2016    PROTTOTAL 7.5 06/13/2016    ALT 26 06/13/2016    AST 15 06/13/2016    ALKPHOS 65 06/13/2016    BILITOTAL 0.5 06/13/2016     OTHER:   Lab Results   Component Value Date    NOE 8.6 06/13/2016    TSH 1.28 06/13/2016    SED 7 06/13/2016       Anesthesia Plan    ASA Status:  2    NPO Status:  NPO Appropriate    Anesthesia Type: MAC.     - Reason for MAC: straight local not clinically adequate              Consents    Anesthesia Plan(s) and associated risks, benefits, and realistic alternatives discussed. Questions answered and patient/representative(s) expressed understanding.     - Discussed: Risks, Benefits and Alternatives for BOTH SEDATION and the PROCEDURE were discussed     - Discussed with:  Patient      - Extended Intubation/Ventilatory Support Discussed: No.      - Patient is DNR/DNI Status: No     Use of blood products discussed: No .     Postoperative Care            Comments:    Other Comments:             BRAD RINCON CRNA    I have reviewed the pertinent notes and labs in the chart from the past 30 days and " (re)examined the patient.  Any updates or changes from those notes are reflected in this note.

## 2024-01-26 ENCOUNTER — LAB (OUTPATIENT)
Dept: LAB | Facility: HOSPITAL | Age: 49
End: 2024-01-26
Attending: INTERNAL MEDICINE
Payer: COMMERCIAL

## 2024-01-26 ENCOUNTER — ANESTHESIA (OUTPATIENT)
Dept: SURGERY | Facility: HOSPITAL | Age: 49
End: 2024-01-26
Payer: COMMERCIAL

## 2024-01-26 ENCOUNTER — HOSPITAL ENCOUNTER (OUTPATIENT)
Facility: HOSPITAL | Age: 49
Discharge: HOME | End: 2024-01-26
Attending: INTERNAL MEDICINE | Admitting: INTERNAL MEDICINE
Payer: COMMERCIAL

## 2024-01-26 VITALS
BODY MASS INDEX: 32.96 KG/M2 | RESPIRATION RATE: 16 BRPM | OXYGEN SATURATION: 96 % | HEART RATE: 62 BPM | DIASTOLIC BLOOD PRESSURE: 54 MMHG | SYSTOLIC BLOOD PRESSURE: 104 MMHG | HEIGHT: 67 IN | TEMPERATURE: 97.6 F | WEIGHT: 210 LBS

## 2024-01-26 LAB — HCG UR QL: NEGATIVE

## 2024-01-26 PROCEDURE — 250N000009 HC RX 250: Performed by: NURSE ANESTHETIST, CERTIFIED REGISTERED

## 2024-01-26 PROCEDURE — 272N000001 HC OR GENERAL SUPPLY STERILE: Performed by: INTERNAL MEDICINE

## 2024-01-26 PROCEDURE — 88305 TISSUE EXAM BY PATHOLOGIST: CPT | Mod: 26 | Performed by: PATHOLOGY

## 2024-01-26 PROCEDURE — 81025 URINE PREGNANCY TEST: CPT | Performed by: NURSE ANESTHETIST, CERTIFIED REGISTERED

## 2024-01-26 PROCEDURE — 370N000017 HC ANESTHESIA TECHNICAL FEE, PER MIN: Performed by: INTERNAL MEDICINE

## 2024-01-26 PROCEDURE — 710N000012 HC RECOVERY PHASE 2, PER MINUTE: Performed by: INTERNAL MEDICINE

## 2024-01-26 PROCEDURE — 250N000011 HC RX IP 250 OP 636: Performed by: NURSE ANESTHETIST, CERTIFIED REGISTERED

## 2024-01-26 PROCEDURE — 45385 COLONOSCOPY W/LESION REMOVAL: CPT | Performed by: NURSE ANESTHETIST, CERTIFIED REGISTERED

## 2024-01-26 PROCEDURE — 999N000141 HC STATISTIC PRE-PROCEDURE NURSING ASSESSMENT: Performed by: INTERNAL MEDICINE

## 2024-01-26 PROCEDURE — 360N000075 HC SURGERY LEVEL 2, PER MIN: Performed by: INTERNAL MEDICINE

## 2024-01-26 PROCEDURE — 88305 TISSUE EXAM BY PATHOLOGIST: CPT | Mod: TC | Performed by: INTERNAL MEDICINE

## 2024-01-26 RX ORDER — LIDOCAINE 40 MG/G
CREAM TOPICAL
Status: DISCONTINUED | OUTPATIENT
Start: 2024-01-26 | End: 2024-01-26 | Stop reason: HOSPADM

## 2024-01-26 RX ORDER — LIDOCAINE HYDROCHLORIDE 20 MG/ML
INJECTION, SOLUTION INFILTRATION; PERINEURAL PRN
Status: DISCONTINUED | OUTPATIENT
Start: 2024-01-26 | End: 2024-01-26

## 2024-01-26 RX ORDER — ONDANSETRON 2 MG/ML
4 INJECTION INTRAMUSCULAR; INTRAVENOUS
Status: DISCONTINUED | OUTPATIENT
Start: 2024-01-26 | End: 2024-01-26 | Stop reason: HOSPADM

## 2024-01-26 RX ORDER — PROPOFOL 10 MG/ML
INJECTION, EMULSION INTRAVENOUS PRN
Status: DISCONTINUED | OUTPATIENT
Start: 2024-01-26 | End: 2024-01-26

## 2024-01-26 RX ORDER — SODIUM CHLORIDE, SODIUM LACTATE, POTASSIUM CHLORIDE, CALCIUM CHLORIDE 600; 310; 30; 20 MG/100ML; MG/100ML; MG/100ML; MG/100ML
INJECTION, SOLUTION INTRAVENOUS CONTINUOUS
Status: DISCONTINUED | OUTPATIENT
Start: 2024-01-26 | End: 2024-01-26 | Stop reason: HOSPADM

## 2024-01-26 RX ADMIN — PROPOFOL 30 MG: 10 INJECTION, EMULSION INTRAVENOUS at 10:34

## 2024-01-26 RX ADMIN — PROPOFOL 50 MG: 10 INJECTION, EMULSION INTRAVENOUS at 10:23

## 2024-01-26 RX ADMIN — MIDAZOLAM 2 MG: 1 INJECTION INTRAMUSCULAR; INTRAVENOUS at 10:21

## 2024-01-26 RX ADMIN — PROPOFOL 30 MG: 10 INJECTION, EMULSION INTRAVENOUS at 10:25

## 2024-01-26 RX ADMIN — PROPOFOL 30 MG: 10 INJECTION, EMULSION INTRAVENOUS at 10:38

## 2024-01-26 RX ADMIN — PROPOFOL 30 MG: 10 INJECTION, EMULSION INTRAVENOUS at 10:30

## 2024-01-26 RX ADMIN — LIDOCAINE HYDROCHLORIDE 40 MG: 20 INJECTION, SOLUTION INFILTRATION; PERINEURAL at 10:23

## 2024-01-26 RX ADMIN — PROPOFOL 20 MG: 10 INJECTION, EMULSION INTRAVENOUS at 10:26

## 2024-01-26 ASSESSMENT — ACTIVITIES OF DAILY LIVING (ADL)
ADLS_ACUITY_SCORE: 33
ADLS_ACUITY_SCORE: 35

## 2024-01-26 NOTE — DISCHARGE INSTRUCTIONS
Colonoscopy: What to Expect at Home  Your Recovery  After a colonoscopy, you'll stay at the clinic until you wake up. Then you can go home. But you'll need to arrange for a ride. Your doctor will tell you when you can eat and do your other usual activities.  Your doctor will talk to you about when you'll need your next colonoscopy. Your doctor can help you decide how often you need to be checked. This will depend on the results of your test and your risk for colorectal cancer.  After the test, you may be bloated or have gas pains. You may need to pass gas. If a biopsy was done or a polyp was removed, you may have streaks of blood in your stool (feces) for a few days. Problems such as heavy rectal bleeding may not occur until several weeks after the test. This isn't common. But it can happen after polyps are removed.  This care sheet gives you a general idea about how long it will take for you to recover. But each person recovers at a different pace. Follow the steps below to get better as quickly as possible.  How can you care for yourself at home?  Activity    Rest when you feel tired.     You can do your normal activities when it feels okay to do so.   Diet    Follow your doctor's directions for eating.     Unless your doctor has told you not to, drink plenty of fluids. This helps to replace the fluids that were lost during the colon prep.     Do not drink alcohol.   Medicines    Your doctor will tell you if and when you can restart your medicines. You will also be given instructions about taking any new medicines.     If you stopped taking aspirin or some other blood thinner, your doctor will tell you when to start taking it again.     If polyps were removed or a biopsy was done during the test, your doctor may tell you not to take aspirin or other anti-inflammatory medicines for a few days. These include ibuprofen (Advil, Motrin) and naproxen (Aleve).   Other instructions    For your safety, do not drive or  "operate machinery until the medicine wears off and you can think clearly. Your doctor may tell you not to drive or operate machinery until the day after your test.     Do not sign legal documents or make major decisions until the medicine wears off and you can think clearly. The anesthesia can make it hard for you to fully understand what you are agreeing to.   Follow-up care is a key part of your treatment and safety. Be sure to make and go to all appointments, and call your doctor if you are having problems. It's also a good idea to know your test results and keep a list of the medicines you take.  When should you call for help?   Call 911 anytime you think you may need emergency care. For example, call if:    You passed out (lost consciousness).     You pass maroon or bloody stools.     You have trouble breathing.   Call your doctor now or seek immediate medical care if:    You have pain that does not get better after you take pain medicine.     You are sick to your stomach or cannot drink fluids.     You have new or worse belly pain.     You have blood in your stools.     You have a fever.     You cannot pass stools or gas.   Watch closely for changes in your health, and be sure to contact your doctor if you have any problems.  Where can you learn more?  Go to [email or web address]  Enter E264 in the search box to learn more about \"Colonoscopy: What to Expect at Home.\"  Current as of: February 28, 2023               Content Version: 13.8    4358-4094 [employer].   Care instructions adapted under license by your healthcare professional. If you have questions about a medical condition or this instruction, always ask your healthcare professional. [employer] disclaims any warranty or liability for your use of this information.    If you have any questions or concerns, please call [facility] at [phone].  After Anesthesia (Sleep Medicine)  What should I " do after anesthesia?  You should rest and relax for the next 24 hours. Avoid risky or difficult (strenuous) activity. A responsible adult should stay with you overnight.  Don't drive or use any heavy equipment for 24 hours. Even if you feel normal, your reactions may be affected by the sleep medicine given to you.  Don't drink alcohol or make any important decisions for 24 hours.  Slowly get back to your regular diet, as you feel able.  How should I expect to feel?  It's normal to feel dizzy, light-headed, or faint for up to a full day after anesthesia or while taking pain medicine. If this happens:   Sit down for a few minutes before standing.  Have someone help you when you get up to walk or use the bathroom.  If you have nausea (feel sick to your stomach) or vomit (throw up):   Drink clear liquids (such as apple juice, ginger ale, broth, or 7UP) until you feel better.  If you feel sick to your stomach, or you keep vomiting for 24 hours, please call the doctor.  What else should I know?  You might have a dry mouth, sore throat, muscle aches, or trouble sleeping. These should go away after 24 hours.  Please contact your doctor if you have any other symptoms that concern you, such as fever, pain, bleeding, fluid drainage, swelling, or headache, or if it's been over 8 to 10 hours and you still aren't able to pee (urinate).  If you have a history of sleep apnea, it's very important to use your CPAP machine for the next 24 hours when you nap or sleep.   For informational purposes only. Not to replace the advice of your health care provider. Copyright   2023 [employer]. All rights reserved. Clinically reviewed by Rommel Grimaldo MD. [employer] [id number] - REV 09/23.

## 2024-01-26 NOTE — ANESTHESIA CARE TRANSFER NOTE
Patient: Dottie Rodriguez    Procedure: Procedure(s):  COLONOSCOPY  with polypectomy       Diagnosis: Encounter for screening colonoscopy [Z12.11]  Diagnosis Additional Information: No value filed.    Anesthesia Type:   MAC     Note:    Oropharynx: oropharynx clear of all foreign objects and spontaneously breathing  Level of Consciousness: drowsy  Oxygen Supplementation: nasal cannula  Level of Supplemental Oxygen (L/min / FiO2): 2  Independent Airway: airway patency satisfactory and stable  Dentition: dentition unchanged  Vital Signs Stable: post-procedure vital signs reviewed and stable  Report to RN Given: handoff report given  Patient transferred to: Phase II    Handoff Report: Identifed the Patient, Identified the Reponsible Provider, Reviewed the pertinent medical history, Discussed the surgical course, Reviewed Intra-OP anesthesia mangement and issues during anesthesia, Set expectations for post-procedure period and Allowed opportunity for questions and acknowledgement of understanding  Vitals:  Vitals Value Taken Time   BP     Temp     Pulse     Resp     SpO2         Electronically Signed By: BRAD Mayorga CRNA  January 26, 2024  10:43 AM

## 2024-01-26 NOTE — H&P
[facility]    History and Physical  General Surgery     Date of Admission:  1/26/2024    Assessment & Plan   Dottie Rodriguez is a 48 year old female who presents with history of polyps family history of colon cancer    Active Problems:    * No active hospital problems. *      Jamir Bangura MD    Code Status       Primary Care Physician     Savanna Cunningham    Chief Complaint   Cancer screening with history of polyps and family history of colon cancer    History is obtained from the patient    History of Present Illness   Dottie Rodriguez is a 48 year old female who presents with cancer screening    Past Medical History    I have reviewed this patient's medical history and updated it with pertinent information if needed.   Past Medical History:   Diagnosis Date    Ovarian cyst 05/18/2017    rupturing ovarian cysts       Past Surgical History   I have reviewed this patient's surgical history and updated it with pertinent information if needed.  Past Surgical History:   Procedure Laterality Date    COLONOSCOPY  08/18/2016    ENT SURGERY      T & A    GYN SURGERY      c section x 2    LAPAROSCOPIC CHOLECYSTECTOMY N/A 08/06/2018    Procedure: LAPAROSCOPIC CHOLECYSTECTOMY;  LAPAROSCOPIC CHOLECYSTECTOMY;  Surgeon: Tyshawn Ford MD;  Location: [facility]       Prior to Admission Medications   Prior to Admission Medications   Prescriptions Last Dose Informant Patient Reported? Taking?   EPINEPHrine (EPIPEN/ADRENACLICK/OR ANY BX GENERIC EQUIV) 0.3 MG/0.3ML injection 2-pack  Self No No   Sig: Inject 0.3 mLs (0.3 mg) into the muscle as needed for anaphylaxis   Patient not taking: Reported on 8/25/2021   HERBALS Past Week Self Yes Yes   Sig: Take 2 each by mouth daily Protandim NRF-1 2 capsules by mouth daily   HERBALS Past Week Self Yes Yes   Sig: Take 1 each by mouth Protandim NRF-2 1 tablet by mouth daily   Probiotic Product (PROBIOTIC DAILY PO) Past Week Self Yes Yes      Facility-Administered Medications:  None     Allergies   Allergies   Allergen Reactions    Apples [Apple Juice] Shortness Of Breath    Nuts Shortness Of Breath    Pineapple Itching    Ciprofloxacin Nausea and Vomiting    Carrots [Daucus Carota] Itching    Cherry Itching    Peaches [Prunus Persica] Itching    Spinach      Can eat the organic        Social History   I have reviewed this patient's social history and updated it with pertinent information if needed. Dottie Rodriguez  reports that she has never smoked. She has never used smokeless tobacco. She reports current alcohol use. She reports that she does not use drugs.    Family History   I have reviewed this patient's family history and updated it with pertinent information if needed.   Family History   Problem Relation Age of Onset    Hypertension Mother     Rheumatoid Arthritis Mother     Hyperlipidemia Father        Review of Systems   CONSTITUTIONAL: NEGATIVE for fever, chills, change in weight  ENT/MOUTH: NEGATIVE for ear, mouth and throat problems  RESP: NEGATIVE for significant cough or SOB  CV: NEGATIVE for chest pain, palpitations or peripheral edema    Physical Exam   Temp: 97.4  F (36.3  C) Temp src: Tympanic BP: 124/90 Pulse: 77   Resp: 16 SpO2: 97 % O2 Device: None (Room air)    Vital Signs with Ranges  Temp:  [97.4  F (36.3  C)] 97.4  F (36.3  C)  Pulse:  [77] 77  Resp:  [16] 16  BP: (124)/(90) 124/90  SpO2:  [97 %] 97 %  210 lbs 0 oz    Respiratory: No increased work of breathing, good air exchange, clear to auscultation bilaterally, no crackles or wheezing  Cardiovascular: Normal apical impulse, regular rate and rhythm, normal S1 and S2, no S3 or S4, and no murmur noted    Data   Results for orders placed or performed in visit on 01/26/24 (from the past 24 hour(s))   HCG qualitative urine   Result Value Ref Range    hCG Urine Qualitative Negative Negative

## 2024-01-26 NOTE — ANESTHESIA POSTPROCEDURE EVALUATION
Patient: Dottie Rodriguez    Procedure: Procedure(s):  COLONOSCOPY  with polypectomy       Anesthesia Type:  MAC    Note:  Disposition: Outpatient   Postop Pain Control: Uneventful            Sign Out: Well controlled pain   PONV: No   Neuro/Psych: Uneventful            Sign Out: Acceptable/Baseline neuro status   Airway/Respiratory: Uneventful            Sign Out: Acceptable/Baseline resp. status   CV/Hemodynamics: Uneventful            Sign Out: Acceptable CV status; No obvious hypovolemia; No obvious fluid overload   Other NRE: NONE   DID A NON-ROUTINE EVENT OCCUR? No           Last vitals:  Vitals Value Taken Time   /54 01/26/24 1130   Temp 97.6  F (36.4  C) 01/26/24 1045   Pulse 62 01/26/24 1130   Resp 16 01/26/24 1130   SpO2 96 % 01/26/24 1130       Electronically Signed By: BRAD Mayorga CRNA  January 26, 2024  12:17 PM

## 2024-01-26 NOTE — OP NOTE
[facility]   Operative Note    Pre-operative diagnosis: Cancer screening history of polyps   Post-operative diagnosis Same 8 mm pedunculated adenoma sigmoid status post cold snare removal otherwise normal exam   Procedure: Procedure(s):  COLONOSCOPY  with polypectomy   Surgeon(s): Surgeon(s) and Role:     * Jamir Donahue MD - [facility]   Estimated blood loss: * No values recorded between 1/26/2024 10:25 AM and 1/26/2024 10:41 AM *    Specimens: ID Type Source Tests Collected by Time Destination   1 :  Polyp Large Intestine, Colon, Sigmoid SURGICAL PATHOLOGY EXAM Jamir Donahue MD 1/26/2024 10:39 AM         Withdrawal time:  Findings: 6 minutes 44 seconds  Sigmoid colon polyp as above         Description of procedure:   After obtaining informed consent reviewing risks of bleeding perforation infection the patient is placed in left lateral decubitus position sedated rectal exam performed lubricated CF H1 90 inserted digitally easily advanced to cecum the appendiceal orifice no cecal valve were identified and photographed the colonoscope was withdrawn with excellent visualization throughout the prep was very good in the sigmoid is a pedunculated polyp measuring approximately 8 mm in diameter this is removed with a cold snare and aspirated to a collection chamber there are very few sigmoid diverticula retroflexion is normal the colonoscope was straightened and withdrawn    Recommendations check biopsy results repeat in 5 years      Jamir Donahue MD on 1/26/2024 at 10:42 AM

## 2024-01-29 LAB
PATH REPORT.COMMENTS IMP SPEC: NORMAL
PATH REPORT.FINAL DX SPEC: NORMAL
PATH REPORT.GROSS SPEC: NORMAL
PATH REPORT.MICROSCOPIC SPEC OTHER STN: NORMAL
PATH REPORT.RELEVANT HX SPEC: NORMAL
PHOTO IMAGE: NORMAL

## 2024-02-16 NOTE — H&P (VIEW-ONLY)
[facility] Surgery Consultation    CC:  Symptomatic cholelithiasis    HPI:  This 43 year old year old female is seen at the request of Savanna Willis for evaluation of symptomatic cholelithiasis.  The history is obtained from the patient, and reviewing the medical record.  She is good medical historian. She states that over the past six months she has had episodes of epigastric pain that will wake her up at night. The pain comes on and will last a few hours and then subside. A few times this has occurred she had eaten late at night. She even went into the emergency room to be evaluated. At that time she had labs drawn and was given a GI cocktail. Unfortunately she says that the GI cocktail, antacids and PPIs have not helped with these symptoms. The labs that were drawn demonstrated normal liver enzymes, electrolytes, and a mild leukocytosis. She was then seen by her PCP who ordered an ultrasound. The US showed cholelithiasis with no no evidence of cholecystitis. She was then referred for further evaluation.    Past Medical History:   Diagnosis Date     Ovarian cyst 05/18/2017    rupturing ovarian cysts       Past Surgical History:   Procedure Laterality Date     COLONOSCOPY  8/18/16     ENT SURGERY      T & A     GYN SURGERY      c section x 2       Pt denied problems with bleeding or anesthesia    Prior to Admission medications    Medication Sig Start Date End Date Taking? Authorizing Provider   HERBALS Take 2 each by mouth daily Protandim NRF-1 2 capsules by mouth daily   Yes Reported, Patient   HERBALS Take 1 each by mouth Protandim NRF-2 1 tablet by mouth daily   Yes Reported, Patient   Probiotic Product (PROBIOTIC DAILY PO)     Reported, Patient          Allergies   Allergen Reactions     Ciprofloxacin Nausea and Vomiting         HABITS:    Social History   Substance Use Topics     Smoking status: Never Smoker     Smokeless tobacco: Never Used     Alcohol use 0.0 oz/week     0 Standard drinks or  "equivalent per week      Comment: social     No mood altering drug use.    Family History   Problem Relation Age of Onset     Hypertension Mother      Rheumatoid Arthritis Mother      Hyperlipidemia Father        REVIEW OF SYSTEMS:  Ten point review of systems negative except those mentioned in the HPI.     The patient denies sleep apnea, latex allergies or MRSA    OBJECTIVE:    BP 92/62  Pulse 70  Temp 98.3  F (36.8  C)  Ht 5' 7\" (1.702 m)  Wt 204 lb (92.5 kg)  SpO2 98%  BMI 31.95 kg/m2    GENERAL: Generally appears well, in no distress with appropriate affect.  HEENT:   Sclerae anicteric - No cervical, supra/infraclavicular lymphadenopathy, no thyroid masses  Respiratory:  Lungs clear to ausculation bilaterally with good air excursion  Cardiovascular:  Regular Rate and Rhythm with no murmurs gallops or rubs, normal   Abdomen: soft, NT/ND  :  deferred  Extremities:  Extremities normal. No deformities, edema, or skin discoloration.  Skin:  no suspicious lesions or rashes  Neurological: grossly intact    Psych:  Alert, oriented, affect appropriate with normal decision making ability.      IMPRESSION:  44 yo female with symptomatic cholelithiasis    PLAN:  The pathophysiology of biliary tract disease was outlined.  The natural course of untreated disease was reviewed as was the definitive surgical approach with laparoscopic cholecystectomy and cholangiography.  The indications, risk, benefits, technical aspects, cosmetic and recuperative expectations were outlined with risks including but not limited to operative death, infection, bleeding and ductal injury requiring further operative intervention.  The need to convert to an open procedure for difficulty in achieving the laparoscopic approach was reviewed and the patient's questions asked and answered.    As she has these epigastric symptoms that are unrelieved by typical acid suppression I suggest that we proceed with a laparoscopic cholecystectomy. I did " discuss that if she continues to have pain we may have to pursue other avenues as to the cause. All questions and concerns were addressed.        Thank you for allowing me to participate in the care of your patient.       Tyshawn Ford MD    8/2/2018  10:40 AM    cc:  Savanna Willis     ambulatory

## 2024-05-13 ENCOUNTER — MYC MEDICAL ADVICE (OUTPATIENT)
Dept: FAMILY MEDICINE | Facility: OTHER | Age: 49
End: 2024-05-13

## 2024-10-04 ENCOUNTER — OFFICE VISIT (OUTPATIENT)
Dept: FAMILY MEDICINE | Facility: OTHER | Age: 49
End: 2024-10-04
Attending: FAMILY MEDICINE
Payer: COMMERCIAL

## 2024-10-04 VITALS
WEIGHT: 200 LBS | RESPIRATION RATE: 16 BRPM | BODY MASS INDEX: 31.39 KG/M2 | DIASTOLIC BLOOD PRESSURE: 74 MMHG | HEIGHT: 67 IN | OXYGEN SATURATION: 97 % | TEMPERATURE: 98.9 F | SYSTOLIC BLOOD PRESSURE: 122 MMHG | HEART RATE: 71 BPM

## 2024-10-04 DIAGNOSIS — J01.90 ACUTE SINUSITIS WITH SYMPTOMS > 10 DAYS: Primary | ICD-10-CM

## 2024-10-04 PROCEDURE — G2211 COMPLEX E/M VISIT ADD ON: HCPCS | Performed by: FAMILY MEDICINE

## 2024-10-04 PROCEDURE — 99213 OFFICE O/P EST LOW 20 MIN: CPT | Performed by: FAMILY MEDICINE

## 2024-10-04 ASSESSMENT — PAIN SCALES - GENERAL: PAINLEVEL: NO PAIN (0)

## 2024-10-04 NOTE — PROGRESS NOTES
"  Assessment & Plan     1. Acute sinusitis with symptoms > 10 days  Given length of time with symptoms, we elected to treat with antibiotics.  Probiotics recommended.  Symptomatic cares reviewed - allergy medication, Tylenol/ibuprofen, etc.  Follow-up if no improvement noted.  - amoxicillin-clavulanate (AUGMENTIN) 875-125 MG tablet; Take 1 tablet by mouth 2 times daily for 10 days.  Dispense: 20 tablet; Refill: 0       The longitudinal plan of care for the diagnosis(es)/condition(s) as documented were addressed during this visit. Due to the added complexity in care, I will continue to support Dottie in the subsequent management and with ongoing continuity of care.     BMI  Estimated body mass index is 31.32 kg/m  as calculated from the following:    Height as of this encounter: 1.702 m (5' 7\").    Weight as of this encounter: 90.7 kg (200 lb).   Weight management plan: Discussed healthy diet and exercise guidelines      Return if symptoms worsen or fail to improve.      Subjective   Dottie is a 49 year old, presenting for the following health issues:  URI    HPI     Acute Illness  Acute illness concerns: 3 weeks  Onset/Duration: 3 weeks  Symptoms:  Fever: No  Chills/Sweats: No  Headache (location?): No  Sinus Pressure: YES  Conjunctivitis:  YES  Ear Pain: YES: right  Rhinorrhea: No  Congestion: YES  Sore Throat: YES- but does not hurt anymore  Cough: YES-productive of green sputum  Wheeze: YES  Decreased Appetite: YES  Nausea: No  Vomiting: No  Diarrhea: No  Dysuria/Freq.: No  Dysuria or Hematuria: No  Fatigue/Achiness: YES  Sick/Strep Exposure: No  Therapies tried and outcome: cough drops, saline spray, increased fluid, advil        Review of Systems  Constitutional, HEENT, cardiovascular, pulmonary, gi and gu systems are negative, except as otherwise noted.      Objective    /74 (BP Location: Left arm, Patient Position: Sitting, Cuff Size: Adult Regular)   Pulse 71   Temp 98.9  F (37.2  C) (Tympanic)   Resp " "16    1.702 m (5' 7\")   Wt 90.7 kg (200 lb)   LMP 09/16/2024 (Approximate)   SpO2 97%   BMI 31.32 kg/m    Body mass index is 31.32 kg/m .  Physical Exam   GENERAL: alert and no distress  EYES: Eyes grossly normal to inspection, PERRL and conjunctivae and sclerae normal  HENT: ear canals and TM's normal, nose and mouth without ulcers or lesions  NECK: no adenopathy  RESP: lungs clear to auscultation - no rales, rhonchi or wheezes  CV: regular rates and rhythm, normal S1 S2, no S3 or S4, and no murmur, click or rub  PSYCH: mentation appears normal, affect normal/bright          Signed Electronically by: Savanna Cunningham MD    "

## 2025-05-11 ENCOUNTER — HEALTH MAINTENANCE LETTER (OUTPATIENT)
Age: 50
End: 2025-05-11

## 2025-07-02 ENCOUNTER — MYC REFILL (OUTPATIENT)
Dept: FAMILY MEDICINE | Facility: OTHER | Age: 50
End: 2025-07-02

## 2025-07-02 DIAGNOSIS — Z91.018 NUT ALLERGY: ICD-10-CM

## 2025-07-02 RX ORDER — EPINEPHRINE 0.3 MG/.3ML
0.3 INJECTION SUBCUTANEOUS PRN
Qty: 0.6 ML | Refills: 1 | Status: SHIPPED | OUTPATIENT
Start: 2025-07-02

## (undated) DEVICE — CAUTERY PAD-POLYHESIVE II ADULT

## (undated) DEVICE — DRAPE-UTILITY TOWEL 15X26"

## (undated) DEVICE — SUTURE-VICRYL 0 UR-6 J603H

## (undated) DEVICE — APPLICATOR-CHLORAPREP 26ML TINTED CHG 2%+ 70% IPA-SURGICAL

## (undated) DEVICE — CORD-LAPAROSCOPIC MONOPOLAR-DISPOSABLE

## (undated) DEVICE — SCISSOR-ENDOPATH 5MM CURVED

## (undated) DEVICE — CLEARIFY VISUALIZATION SYSTEM

## (undated) DEVICE — WANDS-INSULSCAN

## (undated) DEVICE — NDL-18G 1 1/2" NON-SAFETY

## (undated) DEVICE — INZII RETRIEVAL SYSTEM-10MM

## (undated) DEVICE — BLANKET-BAIR UPPER BODY

## (undated) DEVICE — TUBE-SALEM SUMP 18FR STOMACH SUCTION

## (undated) DEVICE — LIGHT HANDLE COVER

## (undated) DEVICE — STERI-STRIP-1/2" X 4"

## (undated) DEVICE — IRRIGATION-NACL 1000ML

## (undated) DEVICE — TUBING SUCTION 20FT N620A

## (undated) DEVICE — BDG-STAT STRIP

## (undated) DEVICE — GLV-7.5 PROTEXIS PI CLASSIC LF/PF

## (undated) DEVICE — ENDO TRAP POLYP E-TRAP 00711099

## (undated) DEVICE — CLEARIFY VISUALIZATION SYSTEM (SCOPE WARMER)

## (undated) DEVICE — LABEL-STERILE PREPRINTED FOR OR

## (undated) DEVICE — ENDO SNARE EXACTO COLD 9MM LOOP 2.4MMX230CM 00711115

## (undated) DEVICE — CANISTER-SUCTION 2000CC

## (undated) DEVICE — SWABSTICK-BENZOIN

## (undated) DEVICE — SOL WATER IRRIG 1000ML BOTTLE 2F7114

## (undated) DEVICE — PACK-LAPAROSCOPY-CUSTOM

## (undated) DEVICE — TUBING-SUCTION 20FT

## (undated) DEVICE — IRRIGATION-H2O 1000ML

## (undated) DEVICE — SUTURE-MONOCRYL 4-0 Y494G

## (undated) DEVICE — CAUTERY-LAP L-HOOK

## (undated) DEVICE — TROCAR-5X100MM FIOS BLADELESS

## (undated) DEVICE — SUCTION-IRRIGATION STRYKEFLOW II (STRYKER)

## (undated) DEVICE — TROCAR SYSTEM-BLUNT TIP 12X100MM KII BALLOON

## (undated) DEVICE — SOL-NACL 0.9% 1000ML

## (undated) DEVICE — SUTURE-MONOCRYL 4-0 PS-2 Y496G

## (undated) DEVICE — COVER-MAYO STAND 23" X 55"

## (undated) DEVICE — CONNECTOR ERBEFLO 2 PORT 20325-215

## (undated) DEVICE — SENSOR-OXISENSOR II ADULT

## (undated) DEVICE — TROCAR SLEEVE-KII 5X100MM

## (undated) DEVICE — GLV-8.0 BIOGEL PF/LF BLUE INDICATOR UNDERGLOVE

## (undated) DEVICE — CLIP APPLIER-LIGAMAX 5MM MEDIUM/LARGE

## (undated) DEVICE — SCD SLEEVE-KNEE REG.

## (undated) DEVICE — SUCTION MANIFOLD NEPTUNE 2 SYS 1 PORT 702-025-000

## (undated) DEVICE — NDL-25G 1-1/2" NON-SAFETY

## (undated) RX ORDER — GLYCOPYRROLATE 0.2 MG/ML
INJECTION, SOLUTION INTRAMUSCULAR; INTRAVENOUS
Status: DISPENSED
Start: 2018-08-06

## (undated) RX ORDER — NEOSTIGMINE METHYLSULFATE 1 MG/ML
VIAL (ML) INJECTION
Status: DISPENSED
Start: 2018-08-06

## (undated) RX ORDER — KETOROLAC TROMETHAMINE 30 MG/ML
INJECTION, SOLUTION INTRAMUSCULAR; INTRAVENOUS
Status: DISPENSED
Start: 2018-08-06

## (undated) RX ORDER — FENTANYL CITRATE 50 UG/ML
INJECTION, SOLUTION INTRAMUSCULAR; INTRAVENOUS
Status: DISPENSED
Start: 2018-08-06

## (undated) RX ORDER — DEXAMETHASONE SODIUM PHOSPHATE 10 MG/ML
INJECTION, SOLUTION INTRAMUSCULAR; INTRAVENOUS
Status: DISPENSED
Start: 2018-08-06

## (undated) RX ORDER — LIDOCAINE HYDROCHLORIDE 20 MG/ML
INJECTION, SOLUTION EPIDURAL; INFILTRATION; INTRACAUDAL; PERINEURAL
Status: DISPENSED
Start: 2018-08-06

## (undated) RX ORDER — ROCURONIUM BROMIDE 50 MG/5 ML
SYRINGE (ML) INTRAVENOUS
Status: DISPENSED
Start: 2018-08-06

## (undated) RX ORDER — ONDANSETRON 2 MG/ML
INJECTION INTRAMUSCULAR; INTRAVENOUS
Status: DISPENSED
Start: 2018-08-06

## (undated) RX ORDER — PROPOFOL 10 MG/ML
INJECTION, EMULSION INTRAVENOUS
Status: DISPENSED
Start: 2018-08-06